# Patient Record
Sex: FEMALE | Race: ASIAN | NOT HISPANIC OR LATINO | Employment: UNEMPLOYED | URBAN - METROPOLITAN AREA
[De-identification: names, ages, dates, MRNs, and addresses within clinical notes are randomized per-mention and may not be internally consistent; named-entity substitution may affect disease eponyms.]

---

## 2017-01-10 ENCOUNTER — COMMUNICATION - HEALTHEAST (OUTPATIENT)
Dept: FAMILY MEDICINE | Facility: CLINIC | Age: 62
End: 2017-01-10

## 2017-01-10 DIAGNOSIS — E55.9 VITAMIN D DEFICIENCY: ICD-10-CM

## 2017-01-10 DIAGNOSIS — I10 ESSENTIAL HYPERTENSION: ICD-10-CM

## 2017-01-10 DIAGNOSIS — J30.2 SEASONAL ALLERGIES: ICD-10-CM

## 2017-01-11 ENCOUNTER — COMMUNICATION - HEALTHEAST (OUTPATIENT)
Dept: FAMILY MEDICINE | Facility: CLINIC | Age: 62
End: 2017-01-11

## 2017-01-11 DIAGNOSIS — F32.9 CHRONIC MAJOR DEPRESSIVE DISORDER: ICD-10-CM

## 2017-01-31 ENCOUNTER — AMBULATORY - HEALTHEAST (OUTPATIENT)
Dept: FAMILY MEDICINE | Facility: CLINIC | Age: 62
End: 2017-01-31

## 2017-01-31 ENCOUNTER — OFFICE VISIT - HEALTHEAST (OUTPATIENT)
Dept: FAMILY MEDICINE | Facility: CLINIC | Age: 62
End: 2017-01-31

## 2017-01-31 DIAGNOSIS — Z23 NEED FOR VACCINATION: ICD-10-CM

## 2017-01-31 DIAGNOSIS — Z00.00 ROUTINE GENERAL MEDICAL EXAMINATION AT A HEALTH CARE FACILITY: ICD-10-CM

## 2017-01-31 DIAGNOSIS — M25.532 PAIN IN BOTH WRISTS: ICD-10-CM

## 2017-01-31 DIAGNOSIS — M1A.0790 CHRONIC GOUT OF ANKLE, UNSPECIFIED CAUSE, UNSPECIFIED LATERALITY: ICD-10-CM

## 2017-01-31 DIAGNOSIS — G89.29 CHRONIC PAIN OF LEFT KNEE: ICD-10-CM

## 2017-01-31 DIAGNOSIS — E55.9 VITAMIN D DEFICIENCY: ICD-10-CM

## 2017-01-31 DIAGNOSIS — I10 ESSENTIAL HYPERTENSION WITH GOAL BLOOD PRESSURE LESS THAN 140/90: ICD-10-CM

## 2017-01-31 DIAGNOSIS — M25.562 CHRONIC PAIN OF LEFT KNEE: ICD-10-CM

## 2017-01-31 DIAGNOSIS — M25.531 PAIN IN BOTH WRISTS: ICD-10-CM

## 2017-01-31 LAB
CHOLEST SERPL-MCNC: 199 MG/DL
FASTING STATUS PATIENT QL REPORTED: NO
HDLC SERPL-MCNC: 42 MG/DL
LDLC SERPL CALC-MCNC: 128 MG/DL
TRIGL SERPL-MCNC: 143 MG/DL

## 2017-01-31 ASSESSMENT — MIFFLIN-ST. JEOR: SCORE: 1066.84

## 2017-02-09 ENCOUNTER — AMBULATORY - HEALTHEAST (OUTPATIENT)
Dept: LAB | Facility: CLINIC | Age: 62
End: 2017-02-09

## 2017-02-09 DIAGNOSIS — Z00.00 ROUTINE GENERAL MEDICAL EXAMINATION AT A HEALTH CARE FACILITY: ICD-10-CM

## 2017-12-26 ENCOUNTER — OFFICE VISIT - HEALTHEAST (OUTPATIENT)
Dept: FAMILY MEDICINE | Facility: CLINIC | Age: 62
End: 2017-12-26

## 2017-12-26 DIAGNOSIS — I10 ESSENTIAL HYPERTENSION: ICD-10-CM

## 2017-12-26 DIAGNOSIS — E55.9 VITAMIN D DEFICIENCY: ICD-10-CM

## 2017-12-26 DIAGNOSIS — I10 ESSENTIAL HYPERTENSION WITH GOAL BLOOD PRESSURE LESS THAN 140/90: ICD-10-CM

## 2017-12-26 DIAGNOSIS — R62.7 POOR WEIGHT GAIN IN ADULT: ICD-10-CM

## 2017-12-26 DIAGNOSIS — R63.0 ANOREXIA: ICD-10-CM

## 2017-12-26 DIAGNOSIS — H91.90 DEAF: ICD-10-CM

## 2017-12-26 DIAGNOSIS — F32.9 CHRONIC MAJOR DEPRESSIVE DISORDER: ICD-10-CM

## 2017-12-26 DIAGNOSIS — M1A.0790 CHRONIC GOUT OF ANKLE, UNSPECIFIED CAUSE, UNSPECIFIED LATERALITY: ICD-10-CM

## 2017-12-26 LAB
CHOLEST SERPL-MCNC: 178 MG/DL
FASTING STATUS PATIENT QL REPORTED: YES
HDLC SERPL-MCNC: 37 MG/DL
LDLC SERPL CALC-MCNC: 106 MG/DL
TRIGL SERPL-MCNC: 176 MG/DL

## 2017-12-26 ASSESSMENT — MIFFLIN-ST. JEOR: SCORE: 1046.08

## 2018-01-12 ENCOUNTER — COMMUNICATION - HEALTHEAST (OUTPATIENT)
Dept: FAMILY MEDICINE | Facility: CLINIC | Age: 63
End: 2018-01-12

## 2018-01-18 ENCOUNTER — OFFICE VISIT - HEALTHEAST (OUTPATIENT)
Dept: FAMILY MEDICINE | Facility: CLINIC | Age: 63
End: 2018-01-18

## 2018-01-18 DIAGNOSIS — E55.9 VITAMIN D DEFICIENCY: ICD-10-CM

## 2018-01-18 DIAGNOSIS — F32.9 CHRONIC MAJOR DEPRESSIVE DISORDER: ICD-10-CM

## 2018-01-18 DIAGNOSIS — I10 ESSENTIAL HYPERTENSION WITH GOAL BLOOD PRESSURE LESS THAN 140/90: ICD-10-CM

## 2018-01-18 ASSESSMENT — MIFFLIN-ST. JEOR: SCORE: 1063.8

## 2018-06-08 ENCOUNTER — COMMUNICATION - HEALTHEAST (OUTPATIENT)
Dept: FAMILY MEDICINE | Facility: CLINIC | Age: 63
End: 2018-06-08

## 2018-06-08 DIAGNOSIS — F32.9 CHRONIC MAJOR DEPRESSIVE DISORDER: ICD-10-CM

## 2018-06-08 DIAGNOSIS — I10 ESSENTIAL HYPERTENSION WITH GOAL BLOOD PRESSURE LESS THAN 140/90: ICD-10-CM

## 2018-06-08 DIAGNOSIS — R63.0 ANOREXIA: ICD-10-CM

## 2018-06-08 DIAGNOSIS — R62.7 POOR WEIGHT GAIN IN ADULT: ICD-10-CM

## 2018-06-08 DIAGNOSIS — I10 ESSENTIAL HYPERTENSION: ICD-10-CM

## 2018-06-08 DIAGNOSIS — E55.9 VITAMIN D DEFICIENCY: ICD-10-CM

## 2018-12-03 ENCOUNTER — OFFICE VISIT - HEALTHEAST (OUTPATIENT)
Dept: FAMILY MEDICINE | Facility: CLINIC | Age: 63
End: 2018-12-03

## 2018-12-03 DIAGNOSIS — I10 ESSENTIAL HYPERTENSION: ICD-10-CM

## 2018-12-03 DIAGNOSIS — M1A.0790 CHRONIC GOUT OF ANKLE, UNSPECIFIED CAUSE, UNSPECIFIED LATERALITY: ICD-10-CM

## 2018-12-03 DIAGNOSIS — H91.90 DEAFNESS, UNSPECIFIED LATERALITY: ICD-10-CM

## 2018-12-03 DIAGNOSIS — Z00.00 MEDICARE ANNUAL WELLNESS VISIT, SUBSEQUENT: ICD-10-CM

## 2018-12-03 DIAGNOSIS — J30.2 SEASONAL ALLERGIES: ICD-10-CM

## 2018-12-03 DIAGNOSIS — F32.9 CHRONIC MAJOR DEPRESSIVE DISORDER: ICD-10-CM

## 2018-12-03 DIAGNOSIS — I10 ESSENTIAL HYPERTENSION WITH GOAL BLOOD PRESSURE LESS THAN 140/90: ICD-10-CM

## 2018-12-03 DIAGNOSIS — E55.9 VITAMIN D DEFICIENCY: ICD-10-CM

## 2018-12-03 LAB
ALBUMIN SERPL-MCNC: 3.7 G/DL (ref 3.5–5)
ALP SERPL-CCNC: 107 U/L (ref 45–120)
ALT SERPL W P-5'-P-CCNC: 20 U/L (ref 0–45)
ANION GAP SERPL CALCULATED.3IONS-SCNC: 10 MMOL/L (ref 5–18)
AST SERPL W P-5'-P-CCNC: 18 U/L (ref 0–40)
BILIRUB SERPL-MCNC: 0.5 MG/DL (ref 0–1)
BUN SERPL-MCNC: 13 MG/DL (ref 8–22)
CALCIUM SERPL-MCNC: 9.5 MG/DL (ref 8.5–10.5)
CHLORIDE BLD-SCNC: 102 MMOL/L (ref 98–107)
CHOLEST SERPL-MCNC: 186 MG/DL
CO2 SERPL-SCNC: 29 MMOL/L (ref 22–31)
CREAT SERPL-MCNC: 0.78 MG/DL (ref 0.6–1.1)
ERYTHROCYTE [DISTWIDTH] IN BLOOD BY AUTOMATED COUNT: 12.2 % (ref 11–14.5)
FASTING STATUS PATIENT QL REPORTED: YES
GFR SERPL CREATININE-BSD FRML MDRD: >60 ML/MIN/1.73M2
GLUCOSE BLD-MCNC: 88 MG/DL (ref 70–125)
HCT VFR BLD AUTO: 42.8 % (ref 35–47)
HDLC SERPL-MCNC: 45 MG/DL
HGB BLD-MCNC: 14 G/DL (ref 12–16)
LDLC SERPL CALC-MCNC: 113 MG/DL
MCH RBC QN AUTO: 28.3 PG (ref 27–34)
MCHC RBC AUTO-ENTMCNC: 32.7 G/DL (ref 32–36)
MCV RBC AUTO: 86 FL (ref 80–100)
PLATELET # BLD AUTO: 214 THOU/UL (ref 140–440)
PMV BLD AUTO: 8.8 FL (ref 7–10)
POTASSIUM BLD-SCNC: 4 MMOL/L (ref 3.5–5)
PROT SERPL-MCNC: 7.7 G/DL (ref 6–8)
RBC # BLD AUTO: 4.95 MILL/UL (ref 3.8–5.4)
SODIUM SERPL-SCNC: 141 MMOL/L (ref 136–145)
TRIGL SERPL-MCNC: 142 MG/DL
TSH SERPL DL<=0.005 MIU/L-ACNC: 0.79 UIU/ML (ref 0.3–5)
URATE SERPL-MCNC: 6.4 MG/DL (ref 2–7.5)
WBC: 5.7 THOU/UL (ref 4–11)

## 2018-12-03 RX ORDER — IBUPROFEN 400 MG/1
TABLET, FILM COATED ORAL
Qty: 40 TABLET | Refills: 5 | Status: SHIPPED | OUTPATIENT
Start: 2018-12-03 | End: 2024-03-01

## 2018-12-03 RX ORDER — CETIRIZINE HYDROCHLORIDE 10 MG/1
10 TABLET ORAL DAILY
Qty: 30 TABLET | Refills: 5 | Status: SHIPPED | OUTPATIENT
Start: 2018-12-03 | End: 2021-11-11

## 2018-12-03 ASSESSMENT — MIFFLIN-ST. JEOR: SCORE: 1069.32

## 2018-12-04 ENCOUNTER — COMMUNICATION - HEALTHEAST (OUTPATIENT)
Dept: FAMILY MEDICINE | Facility: CLINIC | Age: 63
End: 2018-12-04

## 2018-12-04 LAB — 25(OH)D3 SERPL-MCNC: 12.1 NG/ML (ref 30–80)

## 2018-12-11 ENCOUNTER — RECORDS - HEALTHEAST (OUTPATIENT)
Dept: ADMINISTRATIVE | Facility: OTHER | Age: 63
End: 2018-12-11

## 2018-12-20 ENCOUNTER — COMMUNICATION - HEALTHEAST (OUTPATIENT)
Dept: FAMILY MEDICINE | Facility: CLINIC | Age: 63
End: 2018-12-20

## 2019-01-16 ENCOUNTER — COMMUNICATION - HEALTHEAST (OUTPATIENT)
Dept: FAMILY MEDICINE | Facility: CLINIC | Age: 64
End: 2019-01-16

## 2019-02-05 ENCOUNTER — COMMUNICATION - HEALTHEAST (OUTPATIENT)
Dept: FAMILY MEDICINE | Facility: CLINIC | Age: 64
End: 2019-02-05

## 2019-02-05 DIAGNOSIS — E55.9 VITAMIN D DEFICIENCY: ICD-10-CM

## 2019-02-05 DIAGNOSIS — I10 ESSENTIAL HYPERTENSION: ICD-10-CM

## 2019-02-05 DIAGNOSIS — I10 ESSENTIAL HYPERTENSION WITH GOAL BLOOD PRESSURE LESS THAN 140/90: ICD-10-CM

## 2019-02-05 RX ORDER — AMLODIPINE BESYLATE 10 MG/1
TABLET ORAL
Qty: 90 TABLET | Refills: 3 | Status: SHIPPED | OUTPATIENT
Start: 2019-02-05 | End: 2021-11-11

## 2019-05-10 ENCOUNTER — COMMUNICATION - HEALTHEAST (OUTPATIENT)
Dept: FAMILY MEDICINE | Facility: CLINIC | Age: 64
End: 2019-05-10

## 2019-05-10 DIAGNOSIS — R63.0 ANOREXIA: ICD-10-CM

## 2019-05-10 DIAGNOSIS — R62.7 POOR WEIGHT GAIN IN ADULT: ICD-10-CM

## 2019-05-10 RX ORDER — NUTRIT SUPP/INULIN/FOS/FIBER 0.05G-1.06
LIQUID (ML) ORAL
Qty: 5,688 ML | Refills: 5 | Status: SHIPPED | OUTPATIENT
Start: 2019-05-10 | End: 2024-03-01

## 2019-08-02 ENCOUNTER — COMMUNICATION - HEALTHEAST (OUTPATIENT)
Dept: FAMILY MEDICINE | Facility: CLINIC | Age: 64
End: 2019-08-02

## 2019-08-21 ENCOUNTER — COMMUNICATION - HEALTHEAST (OUTPATIENT)
Dept: FAMILY MEDICINE | Facility: CLINIC | Age: 64
End: 2019-08-21

## 2019-10-31 ENCOUNTER — OFFICE VISIT - HEALTHEAST (OUTPATIENT)
Dept: FAMILY MEDICINE | Facility: CLINIC | Age: 64
End: 2019-10-31

## 2019-10-31 DIAGNOSIS — M1A.0790 CHRONIC GOUT OF ANKLE, UNSPECIFIED CAUSE, UNSPECIFIED LATERALITY: ICD-10-CM

## 2019-10-31 DIAGNOSIS — I10 ESSENTIAL HYPERTENSION WITH GOAL BLOOD PRESSURE LESS THAN 140/90: ICD-10-CM

## 2019-10-31 DIAGNOSIS — H91.90 DEAFNESS, UNSPECIFIED LATERALITY: ICD-10-CM

## 2019-10-31 DIAGNOSIS — Z71.85 IMMUNIZATION COUNSELING: ICD-10-CM

## 2019-10-31 DIAGNOSIS — F32.9 CHRONIC MAJOR DEPRESSIVE DISORDER: ICD-10-CM

## 2019-10-31 RX ORDER — DULOXETIN HYDROCHLORIDE 30 MG/1
30 CAPSULE, DELAYED RELEASE ORAL DAILY
Qty: 30 CAPSULE | Refills: 3 | Status: SHIPPED | OUTPATIENT
Start: 2019-10-31 | End: 2021-11-11

## 2019-10-31 RX ORDER — LOSARTAN POTASSIUM 50 MG/1
50 TABLET ORAL DAILY
Qty: 30 TABLET | Refills: 3 | Status: SHIPPED | OUTPATIENT
Start: 2019-10-31 | End: 2021-11-11

## 2019-10-31 ASSESSMENT — MIFFLIN-ST. JEOR: SCORE: 1037.57

## 2019-11-15 ENCOUNTER — COMMUNICATION - HEALTHEAST (OUTPATIENT)
Dept: GERIATRIC MEDICINE | Facility: CLINIC | Age: 64
End: 2019-11-15

## 2019-11-21 ENCOUNTER — COMMUNICATION - HEALTHEAST (OUTPATIENT)
Dept: GERIATRIC MEDICINE | Facility: CLINIC | Age: 64
End: 2019-11-21

## 2020-01-23 ENCOUNTER — COMMUNICATION - HEALTHEAST (OUTPATIENT)
Dept: GERIATRIC MEDICINE | Facility: CLINIC | Age: 65
End: 2020-01-23

## 2020-02-05 ENCOUNTER — OFFICE VISIT - HEALTHEAST (OUTPATIENT)
Dept: FAMILY MEDICINE | Facility: CLINIC | Age: 65
End: 2020-02-05

## 2020-02-05 DIAGNOSIS — M25.562 CHRONIC PAIN OF LEFT KNEE: ICD-10-CM

## 2020-02-05 DIAGNOSIS — F32.1 MODERATE MAJOR DEPRESSION (H): ICD-10-CM

## 2020-02-05 DIAGNOSIS — E55.9 VITAMIN D DEFICIENCY: ICD-10-CM

## 2020-02-05 DIAGNOSIS — G89.29 CHRONIC PAIN OF LEFT KNEE: ICD-10-CM

## 2020-02-05 RX ORDER — ACETAMINOPHEN 500 MG
1000 TABLET ORAL EVERY 6 HOURS PRN
Qty: 100 TABLET | Refills: 2 | Status: SHIPPED | OUTPATIENT
Start: 2020-02-05 | End: 2021-11-11

## 2020-02-05 ASSESSMENT — MIFFLIN-ST. JEOR: SCORE: 1042.11

## 2020-02-05 ASSESSMENT — PATIENT HEALTH QUESTIONNAIRE - PHQ9: SUM OF ALL RESPONSES TO PHQ QUESTIONS 1-9: 17

## 2020-03-05 ENCOUNTER — COMMUNICATION - HEALTHEAST (OUTPATIENT)
Dept: FAMILY MEDICINE | Facility: CLINIC | Age: 65
End: 2020-03-05

## 2020-03-30 ENCOUNTER — RECORDS - HEALTHEAST (OUTPATIENT)
Dept: ADMINISTRATIVE | Facility: OTHER | Age: 65
End: 2020-03-30

## 2020-05-11 ENCOUNTER — COMMUNICATION - HEALTHEAST (OUTPATIENT)
Dept: GERIATRIC MEDICINE | Facility: CLINIC | Age: 65
End: 2020-05-11

## 2020-10-15 ENCOUNTER — COMMUNICATION - HEALTHEAST (OUTPATIENT)
Dept: GERIATRIC MEDICINE | Facility: CLINIC | Age: 65
End: 2020-10-15

## 2020-10-26 ENCOUNTER — COMMUNICATION - HEALTHEAST (OUTPATIENT)
Dept: GERIATRIC MEDICINE | Facility: CLINIC | Age: 65
End: 2020-10-26

## 2020-10-28 ENCOUNTER — COMMUNICATION - HEALTHEAST (OUTPATIENT)
Dept: GERIATRIC MEDICINE | Facility: CLINIC | Age: 65
End: 2020-10-28

## 2021-01-15 ENCOUNTER — AMBULATORY - HEALTHEAST (OUTPATIENT)
Dept: FAMILY MEDICINE | Facility: CLINIC | Age: 66
End: 2021-01-15

## 2021-03-27 ENCOUNTER — AMBULATORY - HEALTHEAST (OUTPATIENT)
Dept: NURSING | Facility: CLINIC | Age: 66
End: 2021-03-27

## 2021-05-27 ASSESSMENT — PATIENT HEALTH QUESTIONNAIRE - PHQ9: SUM OF ALL RESPONSES TO PHQ QUESTIONS 1-9: 17

## 2021-05-28 NOTE — TELEPHONE ENCOUNTER
RN cannot approve Refill Request    RN can NOT refill this medication med is not covered by policy/route to provider     . Last office visit: 1/18/2018 Hector Banda MD Last Physical: 12/3/2018 Last MTM visit: Visit date not found Last visit same specialty: 1/18/2018 Hector Banda MD.  Next visit within 3 mo: Visit date not found  Next physical within 3 mo: Visit date not found      Violet Flores, [facility] Triage/Med Refill 5/10/2019    Requested Prescriptions   Pending Prescriptions Disp Refills     food supplemt, lactose-reduced (ENSURE) Liqd 5688 mL 5     Sig: DRINK 1 BOTTLE BY MOUTH DAILY // IB HNUB HAUS 1 POOM       There is no refill protocol information for this order

## 2021-05-30 VITALS — BODY MASS INDEX: 27.82 KG/M2 | HEIGHT: 59 IN | WEIGHT: 138 LBS

## 2021-05-31 VITALS — BODY MASS INDEX: 27.83 KG/M2 | WEIGHT: 138.03 LBS | HEIGHT: 59 IN

## 2021-05-31 VITALS — BODY MASS INDEX: 28.34 KG/M2 | WEIGHT: 135 LBS | HEIGHT: 58 IN

## 2021-05-31 NOTE — TELEPHONE ENCOUNTER
No CTC on file and phone number listed is for patient's daughter and can not talk to her.  See previous encounter. Sending out letter and postponing out 2 weeks and will try again if appt has not been made.

## 2021-05-31 NOTE — TELEPHONE ENCOUNTER
Upon calling patient to schedule appt, it looks like this patient had a physical on 12/03/2018 with Dr. Banda, does this patient need to be seen again for a Physical? And the contact number on file is for patient's daughter. She stated that patient is hard of hearing and she is the  for patient in regards to all her healthcare needs, and that the  patient does not have a phone, but there is no consent to communicate with patient's daughter on file, so no information can be disclosed.  Advised her that we can not talk to her, and advised her to come in to sign the CTC so we can talk to her. Please advise if patient needs to be seen for another physical?

## 2021-05-31 NOTE — TELEPHONE ENCOUNTER
Called and left message for pt to call clinic back#1 used  line: [facility] ID#[id number]    Quality Goal: Patient is due for a mammogram Screening, Annual Wellness Visit and Blood pressure check. Please call the patient for an appointment with . Thanks!

## 2021-05-31 NOTE — TELEPHONE ENCOUNTER
Pt does not need to be seen for another physical but is due for a blood pressure check and mammogram

## 2021-06-02 VITALS — WEIGHT: 141 LBS | BODY MASS INDEX: 29.6 KG/M2 | HEIGHT: 58 IN

## 2021-06-02 NOTE — PROGRESS NOTES
Subjective: Patient comes in for evaluation.  She has been on amlodipine 10 mg a day although did not take it today.  Pressure was 160/72 last time when she was on it was also elevated.  Want her to continue on the amlodipine 10 mg a day, she has no edema, but will add losartan 50 mg a day I will see her back for recheck in 6 weeks    Regarding her depression she is on sertraline it does not seem like it is really help that much.  She has not been in since last December but has been taking it regularly.    She does have some chronic pain issues in through her neck more on the left than the right goes into her shoulder but no further radicular component.  She does use Tylenol it helps a while but only short-term.    I think there is some somatic component I think changing from sertraline over to duloxetine would be helpful for her.  I placed her on 30 mg duloxetine she will stop the sertraline.    She is due for PCV 13 shot now that she is 64.    Labs from her annual wellness visit in December of last year showed CMP normal  normal TSH normal CBC uric acid normal at 6.4.  She has had some gout in her ankle in the past.  Does not seem to have much symptoms now.    Tobacco status: She  reports that she has never smoked. She has never used smokeless tobacco.    Patient Active Problem List    Diagnosis Date Noted     Chronic pain of left knee 02/04/2017     Essential hypertension with goal blood pressure less than 140/90 01/31/2017     Chronic gout of ankle, unspecified cause, unspecified laterality 01/31/2017     Anorexia 02/19/2016     Deaf 01/30/2016     Chronic major depressive disorder 01/29/2016     Vitamin D deficiency 01/29/2016     Seasonal allergies 01/29/2016       Current Outpatient Medications   Medication Sig Dispense Refill     amLODIPine (NORVASC) 10 MG tablet TAKE 1 TABLET DAILY FOR HIGH BLOOD PRESSURE // IB HNUB NOJ IB LUB PAB KAY NTSHAV SIAB 90 tablet 3     blood pressure test kit-medium Kit     "    cetirizine (ZYRTEC) 10 MG tablet Take 1 tablet (10 mg total) by mouth daily. 30 tablet 5     cholecalciferol, vitamin D3, (VITAMIN D3) 2,000 unit capsule TAKE 1 CAPSULE BY MOUTH DAILY // IB HNUB NOJ IB LUB 90 capsule 3     food supplemt, lactose-reduced (ENSURE) Liqd DRINK 1 BOTTLE BY MOUTH DAILY // IB HNUB HAUS 1 POOM 5688 mL 5     ibuprofen (ADVIL,MOTRIN) 400 MG tablet 1 po tid as needed for gout pain. 40 tablet 5     DULoxetine (CYMBALTA) 30 MG capsule Take 1 capsule (30 mg total) by mouth daily. 30 capsule 3     losartan (COZAAR) 50 MG tablet Take 1 tablet (50 mg total) by mouth daily. 30 tablet 3     No current facility-administered medications for this visit.        ROS:   10 point review of systems positive as outlined above otherwise negative    Objective:    /72 (Patient Site: Left Arm, Patient Position: Sitting, Cuff Size: Adult Regular)   Pulse 60   Temp 97.2  F (36.2  C) (Oral)   Resp 20   Ht 4' 10\" (1.473 m)   Wt 134 lb (60.8 kg)   LMP 01/29/2005   SpO2 100%   BMI 28.01 kg/m    Body mass index is 28.01 kg/m .      General appearance no acute distress    HEENT patient is deaf but is able to talk sounding she communicates with her daughter who is here and there is also an .    Neck with some mild tenderness in the paraspinal muscles.  No radicular component more on the left than the right.    No rashes in through the skin    Heart was regular S1-S2 rate at 60    Lungs are clear throughout no rales or rhonchi, O2 sat was 100%.    Abdomen nontender no guarding or rebound    Lower extremities without edema    No active gouty changes.    Results for orders placed or performed in visit on 12/03/18   Comprehensive Metabolic Panel   Result Value Ref Range    Sodium 141 136 - 145 mmol/L    Potassium 4.0 3.5 - 5.0 mmol/L    Chloride 102 98 - 107 mmol/L    CO2 29 22 - 31 mmol/L    Anion Gap, Calculation 10 5 - 18 mmol/L    Glucose 88 70 - 125 mg/dL    BUN 13 8 - 22 mg/dL    Creatinine " 0.78 0.60 - 1.10 mg/dL    GFR MDRD Af Amer >60 >60 mL/min/1.73m2    GFR MDRD Non Af Amer >60 >60 mL/min/1.73m2    Bilirubin, Total 0.5 0.0 - 1.0 mg/dL    Calcium 9.5 8.5 - 10.5 mg/dL    Protein, Total 7.7 6.0 - 8.0 g/dL    Albumin 3.7 3.5 - 5.0 g/dL    Alkaline Phosphatase 107 45 - 120 U/L    AST 18 0 - 40 U/L    ALT 20 0 - 45 U/L   Lipid Cascade FASTING   Result Value Ref Range    Cholesterol 186 <=199 mg/dL    Triglycerides 142 <=149 mg/dL    HDL Cholesterol 45 (L) >=50 mg/dL    LDL Calculated 113 <=129 mg/dL    Patient Fasting > 8hrs? Yes    Thyroid Stimulating Hormone (TSH)   Result Value Ref Range    TSH 0.79 0.30 - 5.00 uIU/mL   Vitamin D, Total (25-Hydroxy)   Result Value Ref Range    Vitamin D, Total (25-Hydroxy) 12.1 (L) 30.0 - 80.0 ng/mL   HM2(CBC w/o Differential)   Result Value Ref Range    WBC 5.7 4.0 - 11.0 thou/uL    RBC 4.95 3.80 - 5.40 mill/uL    Hemoglobin 14.0 12.0 - 16.0 g/dL    Hematocrit 42.8 35.0 - 47.0 %    MCV 86 80 - 100 fL    MCH 28.3 27.0 - 34.0 pg    MCHC 32.7 32.0 - 36.0 g/dL    RDW 12.2 11.0 - 14.5 %    Platelets 214 140 - 440 thou/uL    MPV 8.8 7.0 - 10.0 fL   Uric Acid   Result Value Ref Range    Uric Acid 6.4 2.0 - 7.5 mg/dL       Assessment:  1. Essential hypertension with goal blood pressure less than 140/90  losartan (COZAAR) 50 MG tablet   2. Chronic major depressive disorder  DULoxetine (CYMBALTA) 30 MG capsule   3. Immunization counseling  Pneumococcal conjugate vaccine 13-valent 6wks-17yrs; >50yrs   4. Deafness, unspecified laterality     5. Chronic gout of ankle, unspecified cause, unspecified laterality       Hypertension suboptimal control, make sure to take amlodipine every day and add losartan 50 mg a day    Regarding major depression and some chronic pain issues.  The sertraline and tried duloxetine 30 mg 1 a day    Immunization counseling PCV 13 given today.    History of gout no active symptoms.    Left cervical pain, can use Tylenol as well.  Consider physical  therapy    We will see her back for recheck in 6 weeks    Plan: As outlined above    This transcription uses voice recognition software, which may contain typographical errors.

## 2021-06-03 VITALS
HEART RATE: 60 BPM | HEIGHT: 58 IN | BODY MASS INDEX: 28.13 KG/M2 | OXYGEN SATURATION: 100 % | RESPIRATION RATE: 20 BRPM | TEMPERATURE: 97.2 F | WEIGHT: 134 LBS | SYSTOLIC BLOOD PRESSURE: 160 MMHG | DIASTOLIC BLOOD PRESSURE: 72 MMHG

## 2021-06-03 NOTE — PROGRESS NOTES
[facility] Care Coordination Contact    Member became effective with  [facility] on 11/1/2019 with [facility]+.  Previous Health Plan: [facility]  Previous Care System: [facility]  Previous care coordinators name and number: John Pérez, [phone]  Waiver Type: CADI  Last MMIS Entry: Date 11/16/18 and Type Doc Change  MMIS visit date if different from above: 11/9/18, Assmt  Services Listed in MMIS:   A3 F MNCHSE-CSP 20 F DAY SVC 35 F CASE MGMT  46 F EXT PCA 06 F HOMEMAKER 22 F INDEPEND  07 F MONEY MGT 98 F OTHER 66 F PCA SUPER  18 F PERSONAL 25 F SUPPLIES 46 F WVRAC RYDER  Member currently receiving the following home care services:     Member currently receiving the following community resources:    [facility] received: No: Requested on 11/15/2019     Edith Hankins  Care Management Specialist  [facility]  [phone]

## 2021-06-03 NOTE — PROGRESS NOTES
[facility] Care Coordination Contact      [facility] Refusal Telephone Assessment    Member refused home visit HRA on 11/19/2019 (reason: does not wish to participate in a face to face visit at this time).   CC spoke to Ma s daughter, Amber Ernandez ([phone]) because Ma is deaf.  Amber reports family communicates with Ma by their own made-up sign language.  Amber states Ma and family does not wish to have a face to face visit with this CC because she gets anxious with new people and is already being followed by CADI . Amber holbrook Ma lives with two teenage sons in an apartment building but spends time at daughter s home with her family. Amber holbrook Ma is receiving PCA, ILS, homemaking, and ADC services through CADI waiver program which are meeting her ongoing needs in the community. Family is also very supportive and assists Ma with cares as needed according to Amber.   CC also reached out to Ma's CADI , Mattie Gallegos ([phone]) to introduced self as Ma's new [facility] CC and requests for [facility] document.  Clayton informed CC that Ma is due for her annual [employer] assessment by [facility] in December and then Mattie will schedule a home visit to review needed services and authorize them at that time.   ER visits: No  Hospitalizations: No  Health concerns: None reported.  Falls/Injuries: No  ADL/IADL Dependencies:   N/A. This CC did not assess.      Member currently receiving the following home care services:   N/A  Member currently receiving the following community resources:  PCA, ADC, Homemaking, ILS, CADI .   Informal support(s):  Family is supportive.    Advanced Care Planning discussion, complete code section.    [facility] Health Plan sponsored benefits: Shared information re: Silver Sneakers/gym memberships, ASA, Calcium +D.    Follow-Up Plan: Member informed of future contact, plan to f/u with member with a 6 month telephone assessment and offer a home visit.  Contact  information shared with member and family, encouraged member to call with any questions or concerns at any time.    Jaswant Cook RN, PHN  [facility] Care Coordinator  Phone: [phone]  Fax [phone]   [email or web address]

## 2021-06-04 VITALS
BODY MASS INDEX: 28.34 KG/M2 | WEIGHT: 135 LBS | SYSTOLIC BLOOD PRESSURE: 137 MMHG | DIASTOLIC BLOOD PRESSURE: 83 MMHG | HEART RATE: 63 BPM | HEIGHT: 58 IN

## 2021-06-05 NOTE — PROGRESS NOTES
Subjective:  64 y.o. female with concerns of needing a wheel chair.  New pt to me, sees partner.  She is deaf.  Daughter interprets her spoken spoken Hmong and communicates to her with signs.  Says form was sent to clinic for wheel chair, bu is not available now.  Sent from .  I cannot find notes to confirm.    Hx of knee pain.  Can walk 10-15 min before cannot walk any more.  Uses can around home.  Want wheel chair for shopping, etc.    Htn.  Reports taking meds.  Denies HA or dizziness.  Energy is low.  Declines blood tests to evaluate.  Requests vitamins.    Depression with change to SNRI Cymbalta in October.  Doesn't want to change anything with that now.  PHQ with slight improvement from 19 to 17    PHQ-9:  Little interest or pleasure in doing things: Several days  Feeling down, depressed, or hopeless: More than half the days  Trouble falling or staying asleep, or sleeping too much: More than half the days  Feeling tired or having little energy: Nearly every day  Poor appetite or overeating: Several days  Feeling bad about yourself - or that you are a failure or have let yourself or your family down: More than half the days  Trouble concentrating on things, such as reading the newspaper or watching television: Nearly every day  Moving or speaking so slowly that other people could have noticed. Or the opposite - being so fidgety or restless that you have been moving around a lot more than usual: Nearly every day  Thoughts that you would be better off dead, or of hurting yourself in some way: Not at all  PHQ-9 Total Score: 17     Outpatient Medications Prior to Visit   Medication Sig Dispense Refill     amLODIPine (NORVASC) 10 MG tablet TAKE 1 TABLET DAILY FOR HIGH BLOOD PRESSURE // IB HNUB NOJ IB LUB PAB KAY NTSHAV SIAB 90 tablet 3     blood pressure test kit-medium Kit        cetirizine (ZYRTEC) 10 MG tablet Take 1 tablet (10 mg total) by mouth daily. 30 tablet 5     cholecalciferol, vitamin D3,  "(VITAMIN D3) 2,000 unit capsule TAKE 1 CAPSULE BY MOUTH DAILY // IB HNUB NOJ IB LUB 90 capsule 3     DULoxetine (CYMBALTA) 30 MG capsule Take 1 capsule (30 mg total) by mouth daily. 30 capsule 3     food supplemt, lactose-reduced (ENSURE) Liqd DRINK 1 BOTTLE BY MOUTH DAILY // IB HNUB HAUS 1 POOM 5688 mL 5     ibuprofen (ADVIL,MOTRIN) 400 MG tablet 1 po tid as needed for gout pain. 40 tablet 5     losartan (COZAAR) 50 MG tablet Take 1 tablet (50 mg total) by mouth daily. 30 tablet 3     No facility-administered medications prior to visit.       Social History     Tobacco Use   Smoking Status Never Smoker   Smokeless Tobacco Never Used      Objective:  /83 (Patient Site: Right Arm, Patient Position: Sitting)   Pulse 63   Ht 4' 10\" (1.473 m)   Wt 135 lb (61.2 kg)   LMP 01/29/2005   BMI 28.22 kg/m    GENERAL: alert, not distressed  CHEST: clear, no rales, rhonchi, or wheezes  CARDIAC: regular without murmur, gallop, or rub  ABDOMEN: soft, non tender, non distended, normal bowel sounds    Knee Left     No effusion  No erythema  No warmth    Tenderness to palpation at inferior to joing line    Lachmans: negative   Anterior drawer: negative     Varus stress: non tender  Valgus stress: nontender    Modified Appleys:  negative     Patellar grind: negative      Assessment and Plan:   1. Chronic pain of left knee  Suspect DJD.  We don't have imaging.  They declined today.  I can try to fill out the forms if I see them for wheel chair, but discussed that it might be difficult to get coverage.  Daughter will try to have them re-sent.  - acetaminophen (TYLENOL EXTRA STRENGTH) 500 MG tablet; Take 2 tablets (1,000 mg total) by mouth every 6 (six) hours as needed for pain.  Dispense: 100 tablet; Refill: 2    2. Vitamin D deficiency  They declined labs to evaluate for causes of low energy.    - multivitamin (ONE A DAY) per tablet; Take 1 tablet by mouth daily.  Dispense: 120 tablet; Refill: 2    3. Moderate major " depression (H)  They declined changes to meds.    Cologard was done 8/16/19  She declined mammogram.

## 2021-06-05 NOTE — PROGRESS NOTES
[facility] Care Coordination Contact      1/23/2020  T/c to Amberortiz Ernandez to f/u on AVS form.  She states Ma has not received the form and request for CC to email her a copy.  CC agreed and emailed Amber a copy per her request.      1/22/2020   CC t/c to Ma, spoke to son, Konstantin Pérez, inquired if Ma has received the AVS form and [facility].  Konstantin is not sure and will check w/ spouse, Amber Eranndez, and get back to CC.  Jaswant Cook RN, PHN  [facility] Care Coordinator  Phone: [phone]  Fax [phone]   [email or web address]

## 2021-06-06 NOTE — TELEPHONE ENCOUNTER
Forms Request  Name of form/paperwork: Other:  Wheel chair request  Have you been seen for this request: Yes:  The patient had anppointment on 2/5/20 requesting the wheelchair  Do we have the form: Caller states she faxed form on 2/5/20 and 2/24/20. Do you have the form? Caller states she will refax the form today.  When is form needed by: As soon as possible  How would you like the form returned: Fax:  [phone]  Patient Notified form requests are processed in 3-5 business days: No. Caller has been waiting for the form since 2/5/20.    Okay to leave a detailed message? Yes

## 2021-06-07 NOTE — TELEPHONE ENCOUNTER
Who is calling:  Amber  Reason for Call:  Checking the status of form for wheel chair  Date of last appointment with primary care: 02/05/20  Okay to leave a detailed message: Yes

## 2021-06-07 NOTE — TELEPHONE ENCOUNTER
Abdiel and Pt's daughter Amber informed that wheel chair order request has been faxed to [phone].

## 2021-06-07 NOTE — PROGRESS NOTES
Visit addendum:      Patient does have ability restriction that impairs activities of daily living.  The mobility limitation is walking reduction, needed due to degenerative arthritis of the knees.  A cane or walker will not be sufficient.  The home has adequate space for a wheelchair.  The wheelchair will improve her ability to participate in activities of daily living.  Patient is willing to use wheelchair in the home.  She will be able to self propel as needed and to have family help to move the wheelchair.  I am prescribing a standard wheelchair with standard seat cushion and general adjustable back cushion.  There should also be elevating leg rests.

## 2021-06-08 NOTE — PROGRESS NOTES
Assessment:      Healthy female exam. Physical  Exam     1. Routine general medical examination at a health care facility  Lipid Emery FASTING    Occult Blood, Fecal    Occult Blood, Fecal    Occult Blood, Fecal   2. Essential hypertension with goal blood pressure less than 140/90  Basic Metabolic Panel   3. Vitamin D deficiency  Vitamin D, Total (25-Hydroxy)   4. Chronic gout of ankle, unspecified cause, unspecified laterality  Uric Acid   5. Pain in both wrists  Wrist Brace w/o Thumb, [place]   6. Chronic pain of left knee  Knee Sleeve, L'Timate   7. Need for vaccination  Tdap vaccine,  8yo or older,  IM    Influenza, Seasonal,Quad Inj, 36+ MOS          Plan:       Blood tests: Basic metabolic panel and Lipoproteins.      Pt declines colonoscopy.  E/M for gout, wrist pain, knee pain.  Pt will use tylenol for pain.  Seen with [facility] , Ophelia Samson.    Subjective:      Sivakumar Ernandez is a 61 y.o. female who presents for an annual exam. The patient is not sexually active. The patient participates in regular exercise: yes. The patient reports that there is not domestic violence in her life.     Pt arrived 20 minutes late.  Dtr does sign language interpretation for her.    Pt attends an adult Day Program every Monday.    She c/o bilateral wrist pain, and left knee pain.  Uses a cane.  Hx gout.    Healthy Habits:   Regular Exercise: Yes  Sunscreen Use: Yes  Healthy Diet: Yes  Dental Visits Regularly: Yes  Seat Belt: Yes  Sexually active: No  Self Breast Exam Monthly:N/A  Hemoccults: No  Flex Sig: No  Colonoscopy: No  Lipid Profile: No  Glucose Screen: No  Prevention of Osteoporosis: No  Last Dexa: No  Guns at Home:  No      Immunization History   Administered Date(s) Administered     Influenza, seasonal,quad inj 36+ mos 01/31/2017     Tdap 01/31/2017     Immunization status: stated as current, but no records available.    No exam data present    Gynecologic History  Patient's last menstrual period was  2005.  Contraception: post menopausal status  Last Pap: . Results were:  Last mammogram: 16. Results were: normal      OB History    Para Term  AB SAB TAB Ectopic Multiple Living   3 3 3             # Outcome Date GA Lbr Irving/2nd Weight Sex Delivery Anes PTL Lv   3 Term            2 Term            1 Term                   Current Outpatient Prescriptions   Medication Sig Dispense Refill     amLODIPine (NORVASC) 10 MG tablet TAKE 1 TABLET DAILY FOR HIGH BLOOD PRESSURE // IB HNUB NOJ IB LUB PAB KAY NTSHAV SIAB 30 tablet 5     blood pressure test kit-medium Kit        cetirizine (ZYRTEC) 10 MG tablet TAKE 1 TABLET BY MOUTH ONCE DAILY FOR ALLERGIES // IB HNUB NOJ 1 LUB PAB KAY KHAUS THUAS 30 tablet 5     FLUoxetine (PROZAC) 10 MG capsule TAKE 1 CAPSULE BY MOUTH ONCE DAILY FOR DEPRESSION // IB HNUB NOJ 1 LUB PAB KAY NYUAB SIAB 30 capsule 0     food supplemt, lactose-reduced (ENSURE ORIGINAL) Liqd Drink 1 can daily 30 Can 11     VITAMIN D3 2,000 unit cap TAKE 1 CAPSULE BY MOUTH DAILY // IB HNUB NOJ IB LUB 30 each 5     No current facility-administered medications for this visit.      Past Medical History:   Diagnosis Date     Fibrocystic breast      No past surgical history on file.  Review of patient's allergies indicates no known allergies.  Family History   Problem Relation Age of Onset     Breast cancer Neg Hx      Social History     Social History     Marital status:      Spouse name: N/A     Number of children: N/A     Years of education: N/A     Occupational History     Not on file.     Social History Main Topics     Smoking status: Never Smoker     Smokeless tobacco: Not on file     Alcohol use No     Drug use: No     Sexual activity: Not on file     Other Topics Concern     Not on file     Social History Narrative       Review of Systems  General:  Denies problem  Eyes: Denies problem  Ears/Nose/Throat: Denies problem  Cardiovascular: Denies problem  Respiratory:  Denies  "problem  Gastrointestinal:  Denies problem, Genitourinary: Denies problem  Musculoskeletal:  Left knee, wrists.  Skin: Denies problem  Neurologic: Denies problem  Psychiatric: Denies problem  Endocrine: Denies problem  Heme/Lymphatic: Denies problem   Allergic/Immunologic: Denies problem        Objective:         Vitals:    01/31/17 0734 01/31/17 0738 01/31/17 0757   BP: (!) 150/92 146/90 136/86   Pulse: 61     Resp: 17     Temp: 98.3  F (36.8  C)     TempSrc: Oral     SpO2: 97%     Weight: 138 lb (62.6 kg)     Height: 4' 10.7\" (1.491 m)       Body mass index is 28.16 kg/(m^2).    Physical Exam:  General Appearance: Alert, cooperative, no distress, appears stated age  Head: Normocephalic, without obvious abnormality, atraumatic  Eyes: PERRL, conjunctiva/corneas clear, EOM's intact  Ears: Normal TM's and external ear canals, both ears  Nose: Nares normal, septum midline,mucosa normal, no drainage  Throat: Lips, mucosa, and tongue normal; teeth and gums normal  Neck: Supple, symmetrical, trachea midline, no adenopathy;  thyroid: not enlarged, symmetric, no tenderness/mass/nodules; no carotid bruit or JVD  Back: Symmetric, no curvature, ROM normal, no CVA tenderness  Lungs: Clear to auscultation bilaterally, respirations unlabored  Breasts: Pt declined  Heart: Regular rate and rhythm, S1 and S2 normal, no murmur, rub, or gallop,   Abdomen: Soft, non-tender, bowel sounds active all four quadrants,  no masses, no organomegaly  Pelvic:Not examined - pt declined  Extremities: Extremities normal, atraumatic, no cyanosis or edema.  Left knee slightly tender near infrapatellar tendon.  Skin: Skin color, texture, turgor normal, no rashes or lesions  Lymph nodes: Cervical, supraclavicular, and axillary nodes normal  Neurologic: Normal        "

## 2021-06-08 NOTE — PROGRESS NOTES
[facility] Care Coordination Contact      6 month telephone assessment completed on 5/11/2020.  CC spoke to Ma s daughter, Amber Ernandez ([phone]) because Ma is deaf.  Amber reports family communicates with Ma by their own made-up sign language.      ER visits: No  Hospitalizations: No.   TCU stays: No  Significant health status changes: None reported.  Falls/Injuries: No  ADL/IADL changes: No  Changes in services: No    Caregiver Assessment follow up:  N/A    Goals: See POC in chart for goal progress documentation.      Will see member in 6 months for an annual health risk assessment.   Encouraged member to call CC with any questions or concerns in the meantime.       Jaswant Cook RN, PHN  [facility] Care Coordinator  Phone: [phone]  Fax [phone]   [email or web address]

## 2021-06-12 NOTE — PROGRESS NOTES
[facility] Care Coordination Contact      [facility] Refusal Telephone Assessment    Member refused home visit HRA on 10/15/2020 (reason:   CC spoke to Ma s daughter, Amber Ernandez ([phone]) because Ma is deaf.  Amber reports family communicates with Ma by their own made-up sign language.  Amber states Ma and family does not wish to have a face to face visit.       ER visits: No  Hospitalizations: No  Health concerns: None reported.  Falls/Injuries: No  ADL/IADL Dependencies:      N/A. This CC did not assess.      Member currently receiving the following home care services:  N/A   Member currently receiving the following community resources:   PCA, ADC, Homemaking, ILS, CADI .  Informal support(s):  Family is supportive.    Advanced Care Planning discussion, complete code section.    [facility] Health Plan sponsored benefits: Shared information re: Silver Sneakers/gym memberships, ASA, Calcium +D.    Follow-Up Plan: Member informed of future contact, plan to f/u with member with a 6 month telephone assessment and offer a home visit.  Contact information shared with member and family, encouraged member to call with any questions or concerns at any time.    Jaswant Cook RN, PHN  [facility] Care Coordinator  Phone: [phone]  Fax [phone]   [email or web address]

## 2021-06-12 NOTE — PROGRESS NOTES
[facility] Care Coordination Contact    Internal CC change effective 11/1/2020.  Mailed member CC Change letter.  Additional tasks to be completed by CMS include: update database & EPIC, enter CC Change in MMIS, and move member files on Q drive.  Shona Amaro  Case Management Specialist  [facility]  [phone]

## 2021-06-12 NOTE — PROGRESS NOTES
[facility] Care Coordination Contact        Client is transferred to [facility]  Alexandra richards 11/01/2020.  A warm hand-off verbal report to new CC was given.      Jaswant Cook RN, PHN  [facility] Care Coordinator  Phone: [phone]  Fax [phone]   [email or web address]

## 2021-06-15 PROBLEM — M25.562 CHRONIC PAIN OF LEFT KNEE: Status: ACTIVE | Noted: 2017-02-04

## 2021-06-15 PROBLEM — M1A.0790 CHRONIC GOUT OF ANKLE, UNSPECIFIED CAUSE, UNSPECIFIED LATERALITY: Status: ACTIVE | Noted: 2017-01-31

## 2021-06-15 PROBLEM — G89.29 CHRONIC PAIN OF LEFT KNEE: Status: ACTIVE | Noted: 2017-02-04

## 2021-06-15 PROBLEM — I10 ESSENTIAL HYPERTENSION: Status: ACTIVE | Noted: 2017-01-31

## 2021-06-15 NOTE — PROGRESS NOTES
Subjective: This patient comes in for evaluation she is a 62-year-old female.  She was last seen over at [place] she had a physical in 2017 with Dr. Ramos.    Patient will be coming to this clinic now.  She states it is more convenient to come here.    She is here with her daughter and granddaughter.  Daughter's name is Amber phone number [phone].    Patient has past history of deafness.  She has chronic gout which is intermittent, vitamin D deficiency    She has been deaf since birth, her   and she lives by herself but has 3 kids in the area.  Came to [place] about 13 years ago.    Labs from last year showed Hemoccults negative vitamin D 8.6 cholesterol 199 HDL 42  uric acid 6.2 BMP is normal    She ran out of all of her medicines in the last couple months.  She has elevated blood pressure today    Also has major depression symptoms and diagnosis.  She had a PHQ 9 score of 21 but her #9 score regarding thoughts of being better off dead or hurting herself was 0.  She has been off her fluoxetine.    I did check labs today as a baseline including CMP lipid uric acid and vitamin D all contact her daughter regarding the results.  I also have the patient come back in 3 weeks    I restarted medications of amlodipine 10 mg a day vitamin D 2000 units daily fluoxetine 10 mg a day ibuprofen to use as needed for gout or joint pain she had no active symptoms.    Additionally she has been on some food supplement/ensure 1 can daily.    She states that she gets the gout pain in her feet.    No additional concerns or issues at this time.  Recheck in 3 weeks    Tobacco status: She  reports that she has never smoked. She does not have any smokeless tobacco history on file.    Patient Active Problem List    Diagnosis Date Noted     Chronic pain of left knee 2017     Essential hypertension with goal blood pressure less than 140/90 2017     Chronic gout of ankle, unspecified cause,  "unspecified laterality 01/31/2017     Anorexia 02/19/2016     Deaf 01/30/2016     Chronic major depressive disorder 01/29/2016     Vitamin D deficiency 01/29/2016     Seasonal allergies 01/29/2016       Current Outpatient Prescriptions   Medication Sig Dispense Refill     amLODIPine (NORVASC) 10 MG tablet TAKE 1 TABLET DAILY FOR HIGH BLOOD PRESSURE // IB HNUB NOJ IB LUB PAB KAY NTSHAV SIAB 30 tablet 5     blood pressure test kit-medium Kit        cholecalciferol, vitamin D3, (VITAMIN D3) 2,000 unit capsule TAKE 1 CAPSULE BY MOUTH DAILY // IB HNUB NOJ IB LUB 30 capsule 5     FLUoxetine (PROZAC) 10 MG capsule TAKE 1 CAPSULE BY MOUTH ONCE DAILY FOR DEPRESSION // IB HNUB NOJ 1 LUB PAB KAY NYUAB SIAB 30 capsule 5     food supplemt, lactose-reduced (ENSURE ORIGINAL) Liqd Drink 1 can daily 30 Can 5     ibuprofen (ADVIL,MOTRIN) 400 MG tablet 1 po tid as needed for gout pain 40 tablet 1     No current facility-administered medications for this visit.        ROS: 10 point review of systems negative other than as outlined above    Objective:    /76  Pulse 66  Temp 97.4  F (36.3  C) (Oral)   Resp 20  Ht 4' 10.25\" (1.48 m)  Wt 135 lb (61.2 kg)  LMP 01/29/2005  SpO2 95% Comment: at rest with room air  BMI 27.97 kg/m2  Body mass index is 27.97 kg/(m^2).      General appearance no acute distress    She signs with her daughter.   was also here.    Vital signs with elevated blood pressure otherwise normal    Neck without adenopathy oropharynx is clear canals and TMs normal    Lungs are clear no rales or rhonchi, heart was regular S1-S2 rate in the 60s    Abdomen soft nontender back without tenderness    Extremities without edema skin was normal.  No joint redness warmth or swelling no obvious gouty changes.    Blood work pending his vitamin D    Additional labs showed  uric acid 5.0 normal CMP    Results for orders placed or performed in visit on 12/26/17   Comprehensive Metabolic Panel   Result Value " Ref Range    Sodium 141 136 - 145 mmol/L    Potassium 4.4 3.5 - 5.0 mmol/L    Chloride 102 98 - 107 mmol/L    CO2 29 22 - 31 mmol/L    Anion Gap, Calculation 10 5 - 18 mmol/L    Glucose 76 70 - 125 mg/dL    BUN 12 8 - 22 mg/dL    Creatinine 0.70 0.60 - 1.10 mg/dL    GFR MDRD Af Amer >60 >60 mL/min/1.73m2    GFR MDRD Non Af Amer >60 >60 mL/min/1.73m2    Bilirubin, Total 0.4 0.0 - 1.0 mg/dL    Calcium 9.7 8.5 - 10.5 mg/dL    Protein, Total 7.8 6.0 - 8.0 g/dL    Albumin 3.7 3.5 - 5.0 g/dL    Alkaline Phosphatase 115 45 - 120 U/L    AST 17 0 - 40 U/L    ALT 16 0 - 45 U/L   Lipid Cascade FASTING   Result Value Ref Range    Cholesterol 178 <=199 mg/dL    Triglycerides 176 (H) <=149 mg/dL    HDL Cholesterol 37 (L) >=50 mg/dL    LDL Calculated 106 <=129 mg/dL    Patient Fasting > 8hrs? Yes    Uric Acid   Result Value Ref Range    Uric Acid 5.0 2.0 - 7.5 mg/dL       Assessment:  1. Essential hypertension with goal blood pressure less than 140/90  amLODIPine (NORVASC) 10 MG tablet    Comprehensive Metabolic Panel   2. Vitamin D deficiency  cholecalciferol, vitamin D3, (VITAMIN D3) 2,000 unit capsule    Vitamin D, Total (25-Hydroxy)   3. Chronic gout of ankle, unspecified cause, unspecified laterality  ibuprofen (ADVIL,MOTRIN) 400 MG tablet    Uric Acid   4. Deaf     5. Essential hypertension  amLODIPine (NORVASC) 10 MG tablet    Lipid [place] FASTING   6. Chronic major depressive disorder  FLUoxetine (PROZAC) 10 MG capsule   7. Anorexia  food supplemt, lactose-reduced (ENSURE ORIGINAL) Liqd    Comprehensive Metabolic Panel   8. Poor weight gain in adult  food supplemt, lactose-reduced (ENSURE ORIGINAL) Liqd     Patient is not on any medication for blood pressure will restart amlodipine    Vitamin D deficiency, restart vitamin D 2000 units a day    Deafness    Chronic gout but uric acid only 5.0 not on any uric acid lowering agents.  Use ibuprofen as needed    Anorexia history using Ensure supplement, generic    Major  depression restart fluoxetine PHQ 9 score was 21 recheck that with next visit consider counseling    Plan: Await full labs contact daughter    Recheck in 3 weeks    This transcription uses voice recognition software, which may contain typographical errors.

## 2021-06-15 NOTE — PROGRESS NOTES
Subjective: This patient comes in for follow-up is a 62-year-old female she had a physical on 12/26/2017    I reviewed the labs BMP normal LFT normal  uric acid 5.0    Blood pressures now controlled she is on amlodipine 10 mg a day also vitamin D was low previously no on 2000 units.  We will plan to recheck vitamin D in a couple months    Patient is now on fluoxetine depression seems to be improved and wanted fill out a PHQ 9 today we will do that in 2 months as well    No additional concerns or issues.    Tobacco status: She  reports that she has never smoked. She does not have any smokeless tobacco history on file.    Patient Active Problem List    Diagnosis Date Noted     Chronic pain of left knee 02/04/2017     Essential hypertension with goal blood pressure less than 140/90 01/31/2017     Chronic gout of ankle, unspecified cause, unspecified laterality 01/31/2017     Anorexia 02/19/2016     Deaf 01/30/2016     Chronic major depressive disorder 01/29/2016     Vitamin D deficiency 01/29/2016     Seasonal allergies 01/29/2016       Current Outpatient Prescriptions   Medication Sig Dispense Refill     amLODIPine (NORVASC) 10 MG tablet TAKE 1 TABLET DAILY FOR HIGH BLOOD PRESSURE // IB HNUB NOJ IB LUB PAB KAY NTSHAV SIAB 30 tablet 5     blood pressure test kit-medium Kit        cholecalciferol, vitamin D3, (VITAMIN D3) 2,000 unit capsule TAKE 1 CAPSULE BY MOUTH DAILY // IB HNUB NOJ IB LUB 30 capsule 5     FLUoxetine (PROZAC) 10 MG capsule TAKE 1 CAPSULE BY MOUTH ONCE DAILY FOR DEPRESSION // IB HNUB NOJ 1 LUB PAB KAY NYUAB SIAB 30 capsule 5     food supplemt, lactose-reduced (ENSURE ORIGINAL) Liqd Drink 1 can daily 30 Can 5     ibuprofen (ADVIL,MOTRIN) 400 MG tablet 1 po tid as needed for gout pain 40 tablet 1     No current facility-administered medications for this visit.        ROS:   10 point review of systems negative other than as outlined above    Objective:    /80 (Patient Site: Left Arm, Patient  "Position: Sitting, Cuff Size: Adult Regular)  Pulse 68  Temp 97.5  F (36.4  C) (Oral)   Ht 4' 10.5\" (1.486 m)  Wt 138 lb 0.5 oz (62.6 kg)  LMP 01/29/2005  BMI 28.36 kg/m2  Body mass index is 28.36 kg/(m^2).  The following are part of a depression follow up plan for the patient:  under care of mental health team    General appearance no acute distress    Vital signs are stable as outlined labs reviewed from below.    Results for orders placed or performed in visit on 12/26/17   Comprehensive Metabolic Panel   Result Value Ref Range    Sodium 141 136 - 145 mmol/L    Potassium 4.4 3.5 - 5.0 mmol/L    Chloride 102 98 - 107 mmol/L    CO2 29 22 - 31 mmol/L    Anion Gap, Calculation 10 5 - 18 mmol/L    Glucose 76 70 - 125 mg/dL    BUN 12 8 - 22 mg/dL    Creatinine 0.70 0.60 - 1.10 mg/dL    GFR MDRD Af Amer >60 >60 mL/min/1.73m2    GFR MDRD Non Af Amer >60 >60 mL/min/1.73m2    Bilirubin, Total 0.4 0.0 - 1.0 mg/dL    Calcium 9.7 8.5 - 10.5 mg/dL    Protein, Total 7.8 6.0 - 8.0 g/dL    Albumin 3.7 3.5 - 5.0 g/dL    Alkaline Phosphatase 115 45 - 120 U/L    AST 17 0 - 40 U/L    ALT 16 0 - 45 U/L   Lipid Cascade FASTING   Result Value Ref Range    Cholesterol 178 <=199 mg/dL    Triglycerides 176 (H) <=149 mg/dL    HDL Cholesterol 37 (L) >=50 mg/dL    LDL Calculated 106 <=129 mg/dL    Patient Fasting > 8hrs? Yes    Uric Acid   Result Value Ref Range    Uric Acid 5.0 2.0 - 7.5 mg/dL   Vitamin D, Total (25-Hydroxy)   Result Value Ref Range    Vitamin D, Total (25-Hydroxy) 11.5 (L) 30.0 - 80.0 ng/mL       Assessment:  1. Essential hypertension with goal blood pressure less than 140/90     2. Vitamin D deficiency     3. Chronic major depressive disorder       Total time with patient was 15 minutes over 10 minutes spent in counseling reviewing results discussing meds and coordinating care    Plan: As discussed above recheck 2 months check PHQ 9 check vitamin D level recheck blood pressure    This transcription uses voice " recognition software, which may contain typographical errors.

## 2021-06-16 PROBLEM — F32.1 MODERATE MAJOR DEPRESSION (H): Status: ACTIVE | Noted: 2020-02-05

## 2021-06-19 NOTE — LETTER
Letter by Jaswant Cook RN at      Author: Jaswant Cook RN Service: -- Author Type: --    Filed:  Encounter Date: 11/15/2019 Status: Signed         November 15, 2019    MADDY ANDRE  [address]  [place] [address]        Dear  Ma,    Welcome to [facility] Care Plus (MSC+) health program. My name is Jaswant Cook RN. I am your MSC+ care coordinator.     I will call you soon to see how you are doing and determine what needs you may have. My job is to help connect you to services, complete an assessment, and develop a care plan with you. There is no charge to you for the care coordination and assessment services. Our goal is to keep you as healthy and independent as possible.     [facility]+ includes the benefits you may receive from Medical Assistance.    Soon, you will receive a new MSC+ member identification (ID) card from [facility]. When you receive it, please use this card along with your [place] Health Care Programs card and Prescription Drug Coverage Program card. When you receive, it please use this card where you get your health services. If you have Medicare, you will need to show your Medicare card when you get health services.    If you have questions, please call me at [phone]. If you reach my voice mail, leave a message and your phone number. If you are hearing impaired, please call the [place] Relay at 898 or [phone] (cntirs-pi-jnvqjb relay service).    Sincerely,        Jaswant Cook RN    E-mail: [email or web address]  Phone: [phone]      [facility]+ [id number] DHS Approved ([id number])  [id number] (11/18)

## 2021-06-19 NOTE — LETTER
Letter by Hector Banda MD at      Author: Hector Banda MD Service: -- Author Type: --    Filed:  Encounter Date: 8/21/2019 Status: (Other)         Sivakumar Ernandez  [address]  [place] [address]      August 21, 2019      Dear Ma,    As a valued [facility] patient, your healthcare needs are our priority.  Your health care team has determined that you are due for an appointment regarding your Blood Pressure.    To help prevent delays in your care, please call the [facility] at [phone].    We look forward to partnering with you to achieve optimal health and wellbeing.    Sincerely,  Your care team at [facility] and [facility]

## 2021-06-21 NOTE — LETTER
Letter by Alexandra Ortiz SW at      Author: Alexandra Ortiz SW Service: -- Author Type: --    Filed:  Encounter Date: 10/26/2020 Status: (Other)             October 26, 2020    MADDY ANDRE  [address]  [place] [address]      Dear Ma:    As a member of [facility] Plus (MSC+) you are provided a care coordinator. I will be your new care coordinator as of 11/1/2020. I will be calling you soon to see how you are doing and determine your needs.    If you have any questions, please feel free to call me at [phone]. If you reach my voice mail, please leave a message and your phone number. If you are hearing impaired, please call the [place] Relay at 707 or [phone] (znlcug-gh-psguow relay service).    I look forward to speaking with you soon.    Sincerely,          DHRUV Hidalgo    E-mail: [email or web address]  [phone]      Ranjeet Espinoza          MSC+ [facility]  [id number] DHS Approved ([id number])  [id number] (11/18)

## 2021-06-22 NOTE — PROGRESS NOTES
Subjective: Patient comes in with her daughter,Amber.  She is here for a physical/wellness visit.    Patient does not want a Pap smear does not want a mammogram is now on colonoscopy we did discuss [place] guard and did refer her for that.    Patient was seen a year ago she has depression she has been on fluoxetine for a while but did not think it helped.  We discussed another medication she is not sure she can to try it her daughter felt that we should try prescribe some sertraline 50 mg with refills we will see how that goes    Blood pressure was elevated she did not take her medication today.  She is on 10 mg of amlodipine    Pain her.  Also a prescription for vitamin D    She denies seeing any other physicians.    She did not want a breast exam pelvic exam or rectal.    They wanted refills for the year and I did do that    Patient's had previous history of gout I did check uric acid no active symptoms    PHQ 9 score was 19.        Tobacco status: She  reports that  has never smoked. she has never used smokeless tobacco.    Patient Active Problem List    Diagnosis Date Noted     Chronic pain of left knee 02/04/2017     Essential hypertension with goal blood pressure less than 140/90 01/31/2017     Chronic gout of ankle, unspecified cause, unspecified laterality 01/31/2017     Anorexia 02/19/2016     Deaf 01/30/2016     Chronic major depressive disorder 01/29/2016     Vitamin D deficiency 01/29/2016     Seasonal allergies 01/29/2016       Current Outpatient Medications   Medication Sig Dispense Refill     amLODIPine (NORVASC) 10 MG tablet TAKE 1 TABLET DAILY FOR HIGH BLOOD PRESSURE // IB HNUB NOJ IB LUB PAB KAY NTSHAV SIAB. 30 tablet 11     blood pressure test kit-medium Kit        cholecalciferol, vitamin D3, (VITAMIN D3) 2,000 unit capsule TAKE 1 CAPSULE BY MOUTH DAILY // IB HNUB NOJ IB LUB. 30 capsule 11     ENSURE DRINK 1 BOTTLE BY MOUTH DAILY // IB HNUB HAUS 1 POOM 5688 mL 5     cetirizine (ZYRTEC) 10 MG tablet  "Take 1 tablet (10 mg total) by mouth daily. 30 tablet 5     ibuprofen (ADVIL,MOTRIN) 400 MG tablet 1 po tid as needed for gout pain. 40 tablet 5     sertraline (ZOLOFT) 50 MG tablet Take 1 tablet (50 mg total) by mouth daily. 30 tablet 11     No current facility-administered medications for this visit.        ROS:   10 point review of systems negative other than as outlined    Objective:    /88 (Patient Site: Left Arm, Patient Position: Sitting, Cuff Size: Adult Regular)   Pulse 60   Resp 16   Ht 4' 10\" (1.473 m)   Wt 141 lb (64 kg)   LMP 01/29/2005   BMI 29.47 kg/m    Body mass index is 29.47 kg/m .      General appearance no acute distress    HEENT neck is supple no adenopathy oropharynx is clear pupils react normally extract movements full    Lungs are clear no rales or rhonchi heart rate was at 60 elevated blood pressure as outlined    Extremities without edema pulses normal    Skin was normal no rashes    Abdomen was soft bowel sounds are normal no guarding or rebound.    No joint redness warmth or swelling no gouty changes.    Labs showed normal CMP  TSH normal    CBC was normal    Vitamin D pending, uric acid 6.4    Results for orders placed or performed in visit on 12/03/18   Comprehensive Metabolic Panel   Result Value Ref Range    Sodium 141 136 - 145 mmol/L    Potassium 4.0 3.5 - 5.0 mmol/L    Chloride 102 98 - 107 mmol/L    CO2 29 22 - 31 mmol/L    Anion Gap, Calculation 10 5 - 18 mmol/L    Glucose 88 70 - 125 mg/dL    BUN 13 8 - 22 mg/dL    Creatinine 0.78 0.60 - 1.10 mg/dL    GFR MDRD Af Amer >60 >60 mL/min/1.73m2    GFR MDRD Non Af Amer >60 >60 mL/min/1.73m2    Bilirubin, Total 0.5 0.0 - 1.0 mg/dL    Calcium 9.5 8.5 - 10.5 mg/dL    Protein, Total 7.7 6.0 - 8.0 g/dL    Albumin 3.7 3.5 - 5.0 g/dL    Alkaline Phosphatase 107 45 - 120 U/L    AST 18 0 - 40 U/L    ALT 20 0 - 45 U/L   Lipid Cascade FASTING   Result Value Ref Range    Cholesterol 186 <=199 mg/dL    Triglycerides 142 <=149 " mg/dL    HDL Cholesterol 45 (L) >=50 mg/dL    LDL Calculated 113 <=129 mg/dL    Patient Fasting > 8hrs? Yes    Thyroid Stimulating Hormone (TSH)   Result Value Ref Range    TSH 0.79 0.30 - 5.00 uIU/mL   HM2(CBC w/o Differential)   Result Value Ref Range    WBC 5.7 4.0 - 11.0 thou/uL    RBC 4.95 3.80 - 5.40 mill/uL    Hemoglobin 14.0 12.0 - 16.0 g/dL    Hematocrit 42.8 35.0 - 47.0 %    MCV 86 80 - 100 fL    MCH 28.3 27.0 - 34.0 pg    MCHC 32.7 32.0 - 36.0 g/dL    RDW 12.2 11.0 - 14.5 %    Platelets 214 140 - 440 thou/uL    MPV 8.8 7.0 - 10.0 fL   Uric Acid   Result Value Ref Range    Uric Acid 6.4 2.0 - 7.5 mg/dL       Assessment:  1. Medicare annual wellness visit, subsequent  Comprehensive Metabolic Panel    Lipid [place]ascade FASTING    Thyroid Stimulating Hormone (TSH)    Vitamin D, Total (25-Hydroxy)    HM2(CBC w/o Differential)    Cologuard   2. Chronic gout of ankle, unspecified cause, unspecified laterality  ibuprofen (ADVIL,MOTRIN) 400 MG tablet    Uric Acid   3. Essential hypertension with goal blood pressure less than 140/90  amLODIPine (NORVASC) 10 MG tablet   4. Deafness, unspecified laterality     5. Vitamin D deficiency  cholecalciferol, vitamin D3, (VITAMIN D3) 2,000 unit capsule   6. Chronic major depressive disorder  sertraline (ZOLOFT) 50 MG tablet   7. Essential hypertension  amLODIPine (NORVASC) 10 MG tablet   8. Seasonal allergies  cetirizine (ZYRTEC) 10 MG tablet     Wellness visit/physical relatively stable    History of gout no active symptoms uric acid look normal and use ibuprofen for degenerative arthritis or gout flares    Hypertension needs to get back on amlodipine    Vitamin D get back on 2000 units of vitamin D    She does have underlying problems with deafness and this is frustrating for her.    Major depression not effective on fluoxetine will try sertraline    Seasonal allergies refill on cetirizine    Plan: As outlined above he also offered counseling but she did not want to do  that    I will contact her daughter with the results on the blood test    This transcription uses voice recognition software, which may contain typographical errors.

## 2021-06-23 NOTE — TELEPHONE ENCOUNTER
"Clinic recently received papers from [facility] for provider to fill out a health care summary for pt.     Provider would like to see pt before he can fill out this form, so called pt to inform her of this. Spoke to her daughter, who stated that pt already had an AWV/physical on 12/03/2018, so she was wondering if provider can use the visit from 12/03/2018 since pt \"comes for a check-up every 6 months anyway.\"     Please advise. Thanks.    "

## 2021-06-23 NOTE — TELEPHONE ENCOUNTER
Refill Approved    Rx renewed per Medication Renewal Policy. Medication was last renewed on 12/3/18.    Violet Flores, [facility] Triage/Med Refill 2/5/2019     Requested Prescriptions   Pending Prescriptions Disp Refills     amLODIPine (NORVASC) 10 MG tablet 90 tablet 3     Sig: TAKE 1 TABLET DAILY FOR HIGH BLOOD PRESSURE // IB HNUB NOJ IB LUB PAB KAY NTSHAV SIAB    Calcium-Channel Blockers Protocol Passed - 2/5/2019  4:33 PM       Passed - PCP or prescribing provider visit in past 12 months or next 3 months    Last office visit with prescriber/PCP: 1/18/2018 Hector Banda MD OR same dept: Visit date not found OR same specialty: 1/18/2018 Hector Banda MD  Last physical: 12/3/2018 Last MTM visit: Visit date not found   Next visit within 3 mo: Visit date not found  Next physical within 3 mo: Visit date not found  Prescriber OR PCP: Hector Banda MD  Last diagnosis associated with med order: 1. Essential hypertension  - amLODIPine (NORVASC) 10 MG tablet; TAKE 1 TABLET DAILY FOR HIGH BLOOD PRESSURE // IB HNUB NOJ IB LUB PAB KAY NTSHAV SIAB  Dispense: 90 tablet; Refill: 3    2. Essential hypertension with goal blood pressure less than 140/90  - amLODIPine (NORVASC) 10 MG tablet; TAKE 1 TABLET DAILY FOR HIGH BLOOD PRESSURE // IB HNUB NOJ IB LUB PAB KAY NTSHAV SIAB  Dispense: 90 tablet; Refill: 3    3. Vitamin D deficiency  - cholecalciferol, vitamin D3, (VITAMIN D3) 2,000 unit capsule; TAKE 1 CAPSULE BY MOUTH DAILY // IB HNUB NOJ IB LUB  Dispense: 90 capsule; Refill: 3    If protocol passes may refill for 12 months if within 3 months of last provider visit (or a total of 15 months).            Passed - Blood pressure filed in past 12 months    BP Readings from Last 1 Encounters:   12/03/18 152/88             cholecalciferol, vitamin D3, (VITAMIN D3) 2,000 unit capsule 90 capsule 3     Sig: TAKE 1 CAPSULE BY MOUTH DAILY // IB HNUB NOJ IB LUB    There is no refill protocol information for this order

## 2021-06-23 NOTE — TELEPHONE ENCOUNTER
Forms were successfully faxed out to [facility].     Dtr was also notified that forms were faxed out. No additional questions.

## 2021-10-19 PROBLEM — F32.9 MAJOR DEPRESSION: Status: ACTIVE | Noted: 2020-02-05

## 2021-10-20 ENCOUNTER — TELEPHONE (OUTPATIENT)
Dept: FAMILY MEDICINE | Facility: CLINIC | Age: 66
End: 2021-10-20

## 2021-11-11 ENCOUNTER — MEDICAL CORRESPONDENCE (OUTPATIENT)
Dept: HEALTH INFORMATION MANAGEMENT | Facility: CLINIC | Age: 66
End: 2021-11-11

## 2021-11-11 ENCOUNTER — OFFICE VISIT (OUTPATIENT)
Dept: FAMILY MEDICINE | Facility: CLINIC | Age: 66
End: 2021-11-11
Payer: COMMERCIAL

## 2021-11-11 VITALS
DIASTOLIC BLOOD PRESSURE: 80 MMHG | SYSTOLIC BLOOD PRESSURE: 168 MMHG | WEIGHT: 139 LBS | OXYGEN SATURATION: 99 % | BODY MASS INDEX: 29.05 KG/M2 | HEART RATE: 73 BPM | TEMPERATURE: 96.5 F

## 2021-11-11 DIAGNOSIS — Z12.31 ENCOUNTER FOR SCREENING MAMMOGRAM FOR BREAST CANCER: ICD-10-CM

## 2021-11-11 DIAGNOSIS — Z11.59 NEED FOR HEPATITIS B SCREENING TEST: ICD-10-CM

## 2021-11-11 DIAGNOSIS — R32 URINARY INCONTINENCE, UNSPECIFIED TYPE: ICD-10-CM

## 2021-11-11 DIAGNOSIS — J30.2 SEASONAL ALLERGIES: ICD-10-CM

## 2021-11-11 DIAGNOSIS — I10 ESSENTIAL HYPERTENSION: Primary | ICD-10-CM

## 2021-11-11 DIAGNOSIS — Z23 NEED FOR PROPHYLACTIC VACCINATION AND INOCULATION AGAINST INFLUENZA: ICD-10-CM

## 2021-11-11 DIAGNOSIS — Z12.11 COLON CANCER SCREENING: ICD-10-CM

## 2021-11-11 DIAGNOSIS — F32.9 CHRONIC MAJOR DEPRESSIVE DISORDER: ICD-10-CM

## 2021-11-11 DIAGNOSIS — Z11.59 NEED FOR HEPATITIS C SCREENING TEST: ICD-10-CM

## 2021-11-11 DIAGNOSIS — E55.9 VITAMIN D DEFICIENCY: ICD-10-CM

## 2021-11-11 LAB
ANION GAP SERPL CALCULATED.3IONS-SCNC: 11 MMOL/L (ref 5–18)
BUN SERPL-MCNC: 12 MG/DL (ref 8–22)
CALCIUM SERPL-MCNC: 9.8 MG/DL (ref 8.5–10.5)
CHLORIDE BLD-SCNC: 102 MMOL/L (ref 98–107)
CHOLEST SERPL-MCNC: 176 MG/DL
CO2 SERPL-SCNC: 28 MMOL/L (ref 22–31)
CREAT SERPL-MCNC: 0.76 MG/DL (ref 0.6–1.1)
FASTING STATUS PATIENT QL REPORTED: ABNORMAL
GFR SERPL CREATININE-BSD FRML MDRD: 81 ML/MIN/1.73M2
GLUCOSE BLD-MCNC: 85 MG/DL (ref 70–125)
HDLC SERPL-MCNC: 48 MG/DL
LDLC SERPL CALC-MCNC: 103 MG/DL
POTASSIUM BLD-SCNC: 4.2 MMOL/L (ref 3.5–5)
SODIUM SERPL-SCNC: 141 MMOL/L (ref 136–145)
T4 FREE SERPL-MCNC: 1.16 NG/DL (ref 0.7–1.8)
TRIGL SERPL-MCNC: 126 MG/DL
TSH SERPL DL<=0.005 MIU/L-ACNC: 0.16 UIU/ML (ref 0.3–5)

## 2021-11-11 PROCEDURE — 86803 HEPATITIS C AB TEST: CPT | Performed by: FAMILY MEDICINE

## 2021-11-11 PROCEDURE — 84439 ASSAY OF FREE THYROXINE: CPT | Performed by: FAMILY MEDICINE

## 2021-11-11 PROCEDURE — 36415 COLL VENOUS BLD VENIPUNCTURE: CPT | Performed by: FAMILY MEDICINE

## 2021-11-11 PROCEDURE — 80061 LIPID PANEL: CPT | Performed by: FAMILY MEDICINE

## 2021-11-11 PROCEDURE — 90471 IMMUNIZATION ADMIN: CPT | Performed by: FAMILY MEDICINE

## 2021-11-11 PROCEDURE — 86706 HEP B SURFACE ANTIBODY: CPT | Performed by: FAMILY MEDICINE

## 2021-11-11 PROCEDURE — 84443 ASSAY THYROID STIM HORMONE: CPT | Performed by: FAMILY MEDICINE

## 2021-11-11 PROCEDURE — 80048 BASIC METABOLIC PNL TOTAL CA: CPT | Performed by: FAMILY MEDICINE

## 2021-11-11 PROCEDURE — 0064A COVID-19,PF,MODERNA (18+ YRS BOOSTER .25ML): CPT | Performed by: FAMILY MEDICINE

## 2021-11-11 PROCEDURE — 87340 HEPATITIS B SURFACE AG IA: CPT | Performed by: FAMILY MEDICINE

## 2021-11-11 PROCEDURE — 99214 OFFICE O/P EST MOD 30 MIN: CPT | Mod: 25 | Performed by: FAMILY MEDICINE

## 2021-11-11 PROCEDURE — 91306 COVID-19,PF,MODERNA (18+ YRS BOOSTER .25ML): CPT | Performed by: FAMILY MEDICINE

## 2021-11-11 PROCEDURE — 90686 IIV4 VACC NO PRSV 0.5 ML IM: CPT | Performed by: FAMILY MEDICINE

## 2021-11-11 RX ORDER — AMLODIPINE BESYLATE 10 MG/1
10 TABLET ORAL DAILY
Qty: 90 TABLET | Refills: 1 | Status: SHIPPED | OUTPATIENT
Start: 2021-11-11 | End: 2024-02-29

## 2021-11-11 RX ORDER — CETIRIZINE HYDROCHLORIDE 10 MG/1
10 TABLET ORAL DAILY
Qty: 30 TABLET | Refills: 5 | Status: SHIPPED | OUTPATIENT
Start: 2021-11-11 | End: 2024-02-29

## 2021-11-11 RX ORDER — DULOXETIN HYDROCHLORIDE 30 MG/1
30 CAPSULE, DELAYED RELEASE ORAL DAILY
Qty: 30 CAPSULE | Refills: 3 | Status: SHIPPED | OUTPATIENT
Start: 2021-11-11 | End: 2022-01-04

## 2021-11-11 RX ORDER — BLOOD PRESSURE TEST KIT-MEDIUM
KIT MISCELLANEOUS
Status: CANCELLED | OUTPATIENT
Start: 2021-11-11

## 2021-11-11 RX ORDER — IBUPROFEN 400 MG/1
TABLET, FILM COATED ORAL
Qty: 40 TABLET | Refills: 5 | Status: CANCELLED | OUTPATIENT
Start: 2021-11-11

## 2021-11-11 RX ORDER — LOSARTAN POTASSIUM 50 MG/1
50 TABLET ORAL DAILY
Qty: 30 TABLET | Refills: 3 | Status: SHIPPED | OUTPATIENT
Start: 2021-11-11 | End: 2021-12-09

## 2021-11-11 RX ORDER — ACETAMINOPHEN 500 MG
1000 TABLET ORAL EVERY 6 HOURS PRN
Qty: 100 TABLET | Refills: 2 | Status: SHIPPED | OUTPATIENT
Start: 2021-11-11 | End: 2024-02-29

## 2021-11-11 RX ORDER — NUTRIT SUPP/INULIN/FOS/FIBER 0.05G-1.06
LIQUID (ML) ORAL
Qty: 5688 ML | Refills: 5 | Status: CANCELLED | OUTPATIENT
Start: 2021-11-11

## 2021-11-11 NOTE — Clinical Note
Please schedule 2 week follow up visit for BP  Can be virtual if they have ability to check BP at home.

## 2021-11-11 NOTE — PROGRESS NOTES
Subjective:  66 year old female with concerns of follow up  Probably not taking HTN meds.  No clinic visit since 2/5/2020.  Needs visit and signatures for PCA help.  daughter with her today.  Provides sign language interpretation and some history.    Hoping for commode chair as well.  Hard to move between upstairs bedroom and lower floors where the bathroom is.    Outpatient Medications Prior to Visit   Medication Sig Dispense Refill     blood pressure test kit-medium Kit [BLOOD PRESSURE TEST KIT-MEDIUM KIT]        food supplemt, lactose-reduced (ENSURE) Liqd [FOOD SUPPLEMT, LACTOSE-REDUCED (ENSURE) LIQD] DRINK 1 BOTTLE BY MOUTH DAILY // IB HNUB HAUS 1 POOM 5,688 mL 5     ibuprofen (ADVIL,MOTRIN) 400 MG tablet [IBUPROFEN (ADVIL,MOTRIN) 400 MG TABLET] 1 po tid as needed for gout pain. 40 tablet 5     acetaminophen (TYLENOL EXTRA STRENGTH) 500 MG tablet [ACETAMINOPHEN (TYLENOL EXTRA STRENGTH) 500 MG TABLET] Take 2 tablets (1,000 mg total) by mouth every 6 (six) hours as needed for pain. 100 tablet 2     amLODIPine (NORVASC) 10 MG tablet [AMLODIPINE (NORVASC) 10 MG TABLET] TAKE 1 TABLET DAILY FOR HIGH BLOOD PRESSURE // IB HNUB NOJ IB LUB PAB KAY NTSHAV SIAB 90 tablet 3     cetirizine (ZYRTEC) 10 MG tablet [CETIRIZINE (ZYRTEC) 10 MG TABLET] Take 1 tablet (10 mg total) by mouth daily. 30 tablet 5     cholecalciferol, vitamin D3, (VITAMIN D3) 2,000 unit capsule [CHOLECALCIFEROL, VITAMIN D3, (VITAMIN D3) 2,000 UNIT CAPSULE] TAKE 1 CAPSULE BY MOUTH DAILY // IB HNUB NOJ IB LUB 90 capsule 3     DULoxetine (CYMBALTA) 30 MG capsule [DULOXETINE (CYMBALTA) 30 MG CAPSULE] Take 1 capsule (30 mg total) by mouth daily. 30 capsule 3     losartan (COZAAR) 50 MG tablet [LOSARTAN (COZAAR) 50 MG TABLET] Take 1 tablet (50 mg total) by mouth daily. 30 tablet 3     multivitamin (ONE A DAY) per tablet [MULTIVITAMIN (ONE A DAY) PER TABLET] Take 1 tablet by mouth daily. 120 tablet 2     No facility-administered medications prior to visit.       History   Smoking Status     Never Smoker   Smokeless Tobacco     Never Used      Objective:  BP (!) 168/80 (BP Location: Right arm, Patient Position: Sitting, Cuff Size: Adult Small)   Pulse 73   Temp (!) 96.5  F (35.8  C) (Temporal)   Wt 63 kg (139 lb)   SpO2 99%   BMI 29.05 kg/m    GENERAL: alert, not distressed  CHEST: clear, no rales, rhonchi, or wheezes  CARDIAC: regular without murmur, gallop, or rub  ABDOMEN: soft, non tender, non distended, normal bowel sounds    Assessment and Plan:   1. Essential hypertension  BP not controlled.  She is likely not taking the medicine she has prescribed as she reports two medicines per day.  - amLODIPine (NORVASC) 10 MG tablet; Take 1 tablet (10 mg) by mouth daily  Dispense: 90 tablet; Refill: 1  - losartan 50  - Basic metabolic panel  (Ca, Cl, CO2, Creat, Gluc, K, Na, BUN)  - TSH with free T4 reflex  - Lipid panel reflex to direct LDL Fasting  - Home Blood Pressure Monitor Order for DME - ONLY FOR DME    2. Seasonal allergies  - cetirizine (ZYRTEC) 10 MG tablet; Take 1 tablet (10 mg) by mouth daily  Dispense: 30 tablet; Refill: 5    3. Vitamin D deficiency  - cholecalciferol 50 MCG (2000 UT) CAPS; Take 2,000 Units by mouth daily  Dispense: 90 capsule; Refill: 3  - Multiple Vitamin (MULTI VITAMIN) TABS; Take 1 tablet by mouth daily  Dispense: 120 tablet; Refill: 2    4. Chronic major depressive disorder  Hard to tell if she has been on this or not.  Will renew.  - DULoxetine (CYMBALTA) 30 MG capsule; Take 1 capsule (30 mg) by mouth daily  Dispense: 30 capsule; Refill: 3    6. Urinary incontinence, unspecified type  Needs rx for gloves for incontinence changes.  Signed forms for her home health/PCA  - NON-STERILE GLOVES  - Commode Chair Order for DME - ONLY FOR DME    7. Need for prophylactic vaccination and inoculation against influenza  - FLU VACCINE, INCREASED ANTIGEN, PRESV FREE, AGE 65+ [16281]    8. Colon cancer screening  Discussed options  They wanted stool  based test.  - MATTHIAS([employer])    9. Need for hepatitis C screening test  Patient agreed to screening  - Hepatitis C Screen Reflex to HCV RNA Quant and Genotype    10. Need for hepatitis B screening test  Patient agreed to screening  - Hepatitis B Surface Antibody  - Hepatitis B surface antigen    11. Encounter for screening mammogram for breast cancer  Did not want this today.    Will order for the future.  - *MA Screening Digital Bilateral; Future     COVID booster shot provided today (moderna).    Follow up in 1-2 weeks for BP recheck.

## 2021-11-12 LAB
HBV SURFACE AB SERPLBLD QL IA.RAPID: NEGATIVE
HBV SURFACE AG SERPL QL IA: NONREACTIVE
HCV AB SERPL QL IA: NONREACTIVE

## 2021-11-14 ENCOUNTER — HEALTH MAINTENANCE LETTER (OUTPATIENT)
Age: 66
End: 2021-11-14

## 2021-11-16 NOTE — PROGRESS NOTES
Spoke with PT daughter @ [phone], offered to help schedule appt for PT with daughter, asked daughter if PT have BP monitor at home, per daughter PT does not have BP monitor yet, but does have the prescription Dr. Rendon wrote for PT. Daughter have not had a chance to  BP monitor yet. Per PT daughter she will call in to schedule the virtual appt once daughter  the BP monitor. Will postpone out 1 week and contact PT daughter again if appt is not schedule yet.

## 2021-11-22 ENCOUNTER — TELEPHONE (OUTPATIENT)
Dept: FAMILY MEDICINE | Facility: CLINIC | Age: 66
End: 2021-11-22
Payer: COMMERCIAL

## 2021-11-22 NOTE — TELEPHONE ENCOUNTER
----- Message from Néstor Rendon MD sent at 11/20/2021 11:24 AM CST -----  Call:Thyroid test shows some overactivity of thyroid gland.  This could be contributing to elevated blood pressure.  I would repeat these labs fairly soon.  Please schedule clinic appointment for 1 to 2 weeks from now.    Also, could get hepatitis B vaccines as it looks like you do not have protection against that virus.

## 2021-11-22 NOTE — TELEPHONE ENCOUNTER
RN attempted to contact patient, but no answer and unable to leave a message.  Will try patient later.    Nicolle Fritz RN  [facility]

## 2021-11-22 NOTE — LETTER
December 3, 2021      Sivakumar Ernandez  [address]  [place] [address]              Dear Ma,    Thyroid test shows some overactivity of thyroid gland.  This could be contributing to elevated blood pressure.  I would repeat these labs fairly soon.  Please schedule clinic appointment for 1 to 2 weeks from now.    Also, could get hepatitis B vaccines as it looks like you do not have protection against that virus.        Sincerely,      Nicolle [facility] RN

## 2021-12-03 NOTE — TELEPHONE ENCOUNTER
RN attempted to contact patient, but no answer.   Unable to leave a message    Letter mailed to patient's home address.      Nicolle BEASLEY, [facility]

## 2021-12-09 ENCOUNTER — OFFICE VISIT (OUTPATIENT)
Dept: FAMILY MEDICINE | Facility: CLINIC | Age: 66
End: 2021-12-09
Payer: COMMERCIAL

## 2021-12-09 VITALS
SYSTOLIC BLOOD PRESSURE: 153 MMHG | HEART RATE: 75 BPM | HEIGHT: 58 IN | RESPIRATION RATE: 20 BRPM | BODY MASS INDEX: 28.76 KG/M2 | WEIGHT: 137 LBS | DIASTOLIC BLOOD PRESSURE: 96 MMHG

## 2021-12-09 DIAGNOSIS — Z60.3 LANGUAGE BARRIER: ICD-10-CM

## 2021-12-09 DIAGNOSIS — G44.229 CHRONIC TENSION-TYPE HEADACHE, NOT INTRACTABLE: ICD-10-CM

## 2021-12-09 DIAGNOSIS — Z75.8 LANGUAGE BARRIER: ICD-10-CM

## 2021-12-09 DIAGNOSIS — I10 ESSENTIAL HYPERTENSION: Primary | ICD-10-CM

## 2021-12-09 DIAGNOSIS — Z12.11 COLON CANCER SCREENING: ICD-10-CM

## 2021-12-09 PROCEDURE — 99214 OFFICE O/P EST MOD 30 MIN: CPT | Performed by: FAMILY MEDICINE

## 2021-12-09 RX ORDER — IBUPROFEN 400 MG/1
TABLET, FILM COATED ORAL
Qty: 40 TABLET | Refills: 5 | Status: CANCELLED | OUTPATIENT
Start: 2021-12-09

## 2021-12-09 RX ORDER — LOSARTAN POTASSIUM 100 MG/1
100 TABLET ORAL DAILY
Qty: 90 TABLET | Refills: 1 | Status: SHIPPED | OUTPATIENT
Start: 2021-12-09 | End: 2024-02-29

## 2021-12-09 RX ORDER — CYCLOBENZAPRINE HCL 5 MG
5 TABLET ORAL 3 TIMES DAILY PRN
Qty: 30 TABLET | Refills: 1 | Status: SHIPPED | OUTPATIENT
Start: 2021-12-09 | End: 2024-03-01

## 2021-12-09 SDOH — SOCIAL STABILITY - SOCIAL INSECURITY: ACCULTURATION DIFFICULTY: Z60.3

## 2021-12-09 ASSESSMENT — MIFFLIN-ST. JEOR: SCORE: 1046.18

## 2021-12-09 NOTE — PROGRESS NOTES
"  Assessment & Plan     Essential hypertension  Not sufficiently controlled. May be exacerbated by headache or the cause of it. In any case recommended increasing losartan to 100 mg as below.  - losartan (COZAAR) 100 MG tablet  Dispense: 90 tablet; Refill: 1    Chronic tension-type headache, not intractable  Chronic daily headache. Think she may do well to avoid analgesics. We will try muscle relaxer to see if that can reduce some of the tension.  - cyclobenzaprine (FLEXERIL) 5 MG tablet  Dispense: 30 tablet; Refill: 1    Colon cancer screening  She submitted a Cologuard but was not able to be processed.  Daughter called the company but they would not talk to her and could only talk to the patient.  Patient cannot be communicated with in any reasonable fashion via the telephone so I will submit a new order.  - COLOGUARD([employer])    Language barrier  Daughter provides interpretation of their idiosyncratic signing  10/31/2019- PATIENT'S DAUGHTER, HARJIT ALWAYS ACCOMPANIES PATIENT TO APPTS.  PATIENT DOES NOT KNOW SIGN LANGUAGE-DAUGHTER CAN COMMUNICATE WITH PATIENT/  NOT NEEDED.-yovani      BMI:   Estimated body mass index is 28.63 kg/m  as calculated from the following:    Height as of this encounter: 1.473 m (4' 10\").    Weight as of this encounter: 62.1 kg (137 lb).     No follow-ups on file.    Néstor Rendon MD  [facility]   Ma is a 66 year old who presents for the following health issues  accompanied by her daughter.    HPI     Hypertension Follow-up      Do you check your blood pressure regularly outside of the clinic? Yes     Are you following a low salt diet? Yes    Are your blood pressures ever more than 140 on the top number (systolic) OR more   than 90 on the bottom number (diastolic), for example 140/90? Yes    Migraine     Since your last clinic visit, how have your headaches changed?  Worsened last 2 weeks HA every evening    How often are you getting " "headaches or migraines? daily     Are you able to do normal daily activities when you have a migraine? No    Are you taking rescue/relief medications? (Select all that apply) Tylenol    How helpful is your rescue/relief medication?  I get no relief    Are you taking any medications to prevent migraines? (Select all that apply)  No    In the past 4 weeks, how often have you gone to urgent care or the emergency room because of your headaches?  0             Objective    BP (!) 153/96 (BP Location: Left leg, Patient Position: Sitting, Cuff Size: Adult Regular)   Pulse 75   Resp 20   Ht 1.473 m (4' 10\")   Wt 62.1 kg (137 lb)   BMI 28.63 kg/m    Body mass index is 28.63 kg/m .  Physical Exam   GENERAL: alert, not distressed  CHEST: clear, no rales, rhonchi, or wheezes  CARDIAC: regular without murmur, gallop, or rub  PERRL-EOMI  EARS: normal tympanic membranes and external auditory canals bilaterally  PHARYNX: no erythema or exudates  MOUTH: well hydrated mucosa, no lesions  NECK: no lymphadenopathy or thyroid nodules             "

## 2021-12-10 NOTE — PROGRESS NOTES
Left message #1 at [phone]. Postponing task out to a week and will try again. If patient returns call back, please help patient schedule an appointment per message below. Thanks!

## 2021-12-17 LAB — COLOGUARD INSUFFICIENT SPECIMEN: NORMAL

## 2021-12-23 ENCOUNTER — PATIENT OUTREACH (OUTPATIENT)
Dept: GERIATRIC MEDICINE | Facility: CLINIC | Age: 66
End: 2021-12-23
Payer: COMMERCIAL

## 2021-12-23 NOTE — PROGRESS NOTES
[facility] Six-Month Telephone Assessment    6 month telephone assessment completed on 12/23/21.    ER visits: No  Hospitalizations: No  TCU stays: No  Significant health status changes: None reported.   Falls/Injuries: No  ADL/IADL changes: No  Changes in services: No    Caregiver Assessment follow up:  N/A    Goals: See POC in chart for goal progress documentation.      Will see member in 6 months for an annual health risk assessment.   Encouraged member to call CC with any questions or concerns in the meantime.       DHRUV Hidalgo  [facility]  [phone]

## 2021-12-28 ENCOUNTER — MEDICAL CORRESPONDENCE (OUTPATIENT)
Dept: HEALTH INFORMATION MANAGEMENT | Facility: CLINIC | Age: 66
End: 2021-12-28
Payer: COMMERCIAL

## 2022-01-04 ENCOUNTER — TELEPHONE (OUTPATIENT)
Dept: FAMILY MEDICINE | Facility: CLINIC | Age: 67
End: 2022-01-04

## 2022-01-04 ENCOUNTER — VIRTUAL VISIT (OUTPATIENT)
Dept: FAMILY MEDICINE | Facility: CLINIC | Age: 67
End: 2022-01-04
Payer: COMMERCIAL

## 2022-01-04 DIAGNOSIS — I10 ESSENTIAL HYPERTENSION: Primary | ICD-10-CM

## 2022-01-04 DIAGNOSIS — F32.9 CHRONIC MAJOR DEPRESSIVE DISORDER: ICD-10-CM

## 2022-01-04 DIAGNOSIS — R79.89 LOW TSH LEVEL: ICD-10-CM

## 2022-01-04 DIAGNOSIS — G44.229 CHRONIC TENSION-TYPE HEADACHE, NOT INTRACTABLE: ICD-10-CM

## 2022-01-04 PROCEDURE — 99214 OFFICE O/P EST MOD 30 MIN: CPT | Mod: 95 | Performed by: FAMILY MEDICINE

## 2022-01-04 RX ORDER — DULOXETIN HYDROCHLORIDE 30 MG/1
30 CAPSULE, DELAYED RELEASE ORAL DAILY
Qty: 90 CAPSULE | Refills: 1 | Status: SHIPPED | OUTPATIENT
Start: 2022-01-04

## 2022-01-04 RX ORDER — ATENOLOL 25 MG/1
TABLET ORAL
Qty: 180 TABLET | Refills: 1 | Status: SHIPPED | OUTPATIENT
Start: 2022-01-04 | End: 2024-02-29

## 2022-01-04 ASSESSMENT — PATIENT HEALTH QUESTIONNAIRE - PHQ9: SUM OF ALL RESPONSES TO PHQ QUESTIONS 1-9: 0

## 2022-01-04 NOTE — PROGRESS NOTES
Ma is a 66 year old who is being evaluated via a billable video visit.      How would you like to obtain your AVS? Mail a copy  If the video visit is dropped, the invitation should be resent by: Text to cell phone: [phone]  Will anyone else be joining your video visit? No      Video Start Time: 11:16 am      ICD-10-CM    1. Chronic tension-type headache, not intractable  G44.229 atenolol (TENORMIN) 25 MG tablet   2. Essential hypertension  I10 atenolol (TENORMIN) 25 MG tablet   3. Chronic major depressive disorder  F32.9 DULoxetine (CYMBALTA) 30 MG capsule   4. Low TSH level  R79.89 TSH     T4, free     Chronic headaches, add atenolol.    Hypertension poor control add atenolol stay on losartan and amlodipine.    Major depression restart duloxetine    Low TSH needs to follow-up for free T4 and TSH.    Check nurse only blood pressure at that time as well.        Subjective   Ma is a 66 year old who presents for the following health issues     HPI   Patient follows up on blood pressure and headaches and depression.    She continues to have headache is on the top of her head sometimes pounding symptoms steady no real vision change    Her blood pressure still elevated today at 170/105 range.    Last time when she saw Dr. Rendon was 153/96.  He increased her losartan from 50 to 100 mg a day.  She has normal renal function.  She also is on amlodipine daily.    I elected to add atenolol 25 mg 1 twice a day, may help if she has some vascular component to her headache.  Should improve blood pressure as well    She did have a suppressed TSH at 0.16 this needs to get recheck she will come in for a TSH and a free T4.    She is not been taking her duloxetine for depression.  I think this may be contributing to her headaches as well she will get back on 30 mg daily.    No additional concern or issue      Review of Systems   10 point review of systems positive as outlined above otherwise negative      Objective    Vitals -  Patient Reported  Systolic (Patient Reported): (!) 173  Diastolic (Patient Reported): (!) 106  Blood pressure taken with manual cuff (will exclude from quality measure): Yes  Pulse (Patient Reported): 74        Physical Exam   General appearance no acute distress    Vital signs as outlined    Lungs nonlabored breathing no wheezing    Heart: No palpitations or rapid heart rate.    Skin without rash no rash in through the scalp or head or face.    No temporal artery tenderness.  No focal weakness or numbness.    Previous labs as outlined    Patient will come in for lab only free T4 and a TSH.  We will also check blood pressure rechecked at that time            Video-Visit Details    Type of service:  Video Visit    Video End Time: 11:27 AM    Originating Location (pt. Location): Home    Distant Location (provider location):  [facility]     Platform used for Video Visit: Pumant

## 2022-01-07 ENCOUNTER — ALLIED HEALTH/NURSE VISIT (OUTPATIENT)
Dept: FAMILY MEDICINE | Facility: CLINIC | Age: 67
End: 2022-01-07
Payer: COMMERCIAL

## 2022-01-07 ENCOUNTER — LAB (OUTPATIENT)
Dept: LAB | Facility: CLINIC | Age: 67
End: 2022-01-07
Payer: COMMERCIAL

## 2022-01-07 VITALS — SYSTOLIC BLOOD PRESSURE: 150 MMHG | DIASTOLIC BLOOD PRESSURE: 90 MMHG

## 2022-01-07 DIAGNOSIS — R79.89 LOW TSH LEVEL: ICD-10-CM

## 2022-01-07 DIAGNOSIS — I10 ESSENTIAL HYPERTENSION: Primary | ICD-10-CM

## 2022-01-07 LAB
T4 FREE SERPL-MCNC: 1.19 NG/DL (ref 0.7–1.8)
TSH SERPL DL<=0.005 MIU/L-ACNC: 0.25 UIU/ML (ref 0.3–5)

## 2022-01-07 PROCEDURE — 99207 PR NO CHARGE NURSE ONLY: CPT

## 2022-01-07 PROCEDURE — 36415 COLL VENOUS BLD VENIPUNCTURE: CPT

## 2022-01-07 PROCEDURE — 84443 ASSAY THYROID STIM HORMONE: CPT

## 2022-01-07 PROCEDURE — 84439 ASSAY OF FREE THYROXINE: CPT

## 2022-01-24 ENCOUNTER — TELEPHONE (OUTPATIENT)
Dept: FAMILY MEDICINE | Facility: CLINIC | Age: 67
End: 2022-01-24
Payer: COMMERCIAL

## 2022-01-24 NOTE — TELEPHONE ENCOUNTER
Called pt and left VM to call back to clinic.  Ok to relay results when pt calls back and schedule appt. Also called daughter but also not able to answer phone.  Cell phone for daughter is incorrect - [employer] number.   1st attempt. Thanks            ----- Message from Hector Banda MD sent at 1/24/2022 12:51 PM CST -----  Please contact patient.  Let her know that the thyroid level is improving.No additional medications at this time.Recheck with follow-up visit in 3 to 4 months

## 2022-01-24 NOTE — LETTER
2022      Ma Oma  [address]  [place] [address]        Dear ,    We are writing to inform you of your test results.    The thyroid level is improving. No additional medications at this time.    Recheck with follow-up visit in 3 to 4 months    Resulted Orders   TSH   Result Value Ref Range    TSH 0.25 (L) 0.30 - 5.00 uIU/mL   T4, free   Result Value Ref Range    Free T4 1.19 0.70 - 1.80 ng/dL      Comment:      Performance of the Free T4 test has not been established with  specimens (<= 2 months of age).         If you have any questions or concerns, please call the clinic at the number listed above.       Sincerely,      Dr. Banda

## 2022-02-01 NOTE — PROGRESS NOTES
I met with Sivakumar Ernandez at the request of Hector Banda   to recheck her blood pressure.  Blood pressure medications on the med list were reviewed with patient.    Patient has taken all medications as per usual regimen: Yes  Patient reports tolerating them without any issues or concerns: Yes    Vitals:    01/07/22 1127 01/07/22 1141   BP: (!) 172/96 (!) 150/90       After 5 minutes, the patient's blood pressure remained greater than or equal to 140/90.    Is the patient currently having any chest pain? No  Does the patient currently have a headache? No  Does the patient currently have any vision changes? No  Does the patient currently have any nausea? No  Does the patient currently have any abdominal pain? No    The previous encounter was reviewed.  The patient was discharged and the note will be sent to the provider for final review.

## 2022-02-16 ENCOUNTER — MEDICAL CORRESPONDENCE (OUTPATIENT)
Dept: HEALTH INFORMATION MANAGEMENT | Facility: CLINIC | Age: 67
End: 2022-02-16
Payer: COMMERCIAL

## 2022-02-16 ENCOUNTER — TELEPHONE (OUTPATIENT)
Dept: FAMILY MEDICINE | Facility: CLINIC | Age: 67
End: 2022-02-16
Payer: COMMERCIAL

## 2022-02-16 NOTE — LETTER
February 18, 2022      Sivakumar Ernandez  [address]  [place] [address]        To Whom It May Concern,      We have been trying to you contact you and your daughter in regards to a recent request for a PCA. We tried calling your daughter on three separate occasions, but was unable to reach her. We were not aware of any PCA request in reviewing the last couple of [facility] chart notes.    Are you wanting a PCA or wanting supplies from [facility] medical like incontinence supplies? We need more information for the following request.     If you have any questions regarding the following message, please call the [facility] Clinic number above.          Sincerely,        Hector Banda MD

## 2022-02-16 NOTE — TELEPHONE ENCOUNTER
"RN attempt to call pt's daughter, Amber Ernandez (C2C on file) regarding initial message below. No answer. Voicemail indicates \"We're sorry, your call cannot be completed at this time. Please try again another time.\" Unable to leave voicemail.    Will attempt to call pt's daughter another time to gain more information as requested from Dr. Banda below.    BRADLEY SaraviaN, RN   [facility]    "

## 2022-02-16 NOTE — TELEPHONE ENCOUNTER
I am not aware of any PCA request.  I reviewed the chart notes for the last couple months.    And I am not sure why [facility] would be involved in the PCA request.    Please get more information.  Are they actually wanting a PCA, that usually comes through the county and a [facility] nurse goes out and assesses the patient to see if she needs a PCA.    Or do they want something from [facility], like incontinence supplies etc.    I need to know what the diagnosis is for any of these requests as well, why does she need a PCA, why does she need medical supplies etc.

## 2022-02-16 NOTE — TELEPHONE ENCOUNTER
Reason for Call:  Other     Detailed comments: daughter calling to check status of the APA for a PCA request sent to clinic     Phone Number Patient can be reached at: Other phone number:  [phone]    Best Time: anytime    Can we leave a detailed message on this number? YES    Call taken on 2/16/2022 at 12:03 PM by Barbie Chicas

## 2022-02-17 NOTE — TELEPHONE ENCOUNTER
RN made 2nd attempt to contact patient's daughter using a [employer] , but no answer. Phone just rings. Will try patient's daughter later.    Nicolle Fritz RN  [facility]

## 2022-02-18 NOTE — TELEPHONE ENCOUNTER
RN attempt #3 in trying to call pt with the help of a [facility] . No answer. Phone just keeps ringing.    Third attempt in trying to reach pt's daughter, Amber Ernandez (C2C on file) regarding request for PCA.    Letter will be sent out.    If pt daughter calls back, please transfer to RN line to obtain more information regarding their request for the a PCA (as mentioned in Dr. Banda's message below).    Thanks,    Closing encounter.    BRADLEY SaraviaN, RN   [facility]

## 2022-02-28 ENCOUNTER — APPOINTMENT (OUTPATIENT)
Dept: INTERPRETER SERVICES | Facility: CLINIC | Age: 67
End: 2022-02-28
Payer: COMMERCIAL

## 2022-02-28 ENCOUNTER — TELEPHONE (OUTPATIENT)
Dept: FAMILY MEDICINE | Facility: CLINIC | Age: 67
End: 2022-02-28
Payer: COMMERCIAL

## 2022-02-28 NOTE — TELEPHONE ENCOUNTER
"Amber, daughter of patient Sivakumar Ernandez calling and she is calling because she got a call from the clinic and said that the voicemail said just \"to call them back\" Daughter thinks it is because of the medical supplies.  Mom used to see Dr. Banda apparently, but now sees Dr. Rendon.  So this message needs to go to Dr. Rendon.  Daughter says she had her diapers, but needs the \"gloves\" Mom needs size small latex gloves.  This is all that she needs. She had never had them before, but needs them. The  apparently requested the gloves. The  is Bianka Arnold from [facility] and her phone is [phone] and daughter Amber can be reached at:    [phone] and you can leave a detailed voice mail if needed. Thank you.   "

## 2022-03-01 ENCOUNTER — APPOINTMENT (OUTPATIENT)
Dept: INTERPRETER SERVICES | Facility: CLINIC | Age: 67
End: 2022-03-01
Payer: COMMERCIAL

## 2022-03-01 DIAGNOSIS — R32 URINARY INCONTINENCE, UNSPECIFIED TYPE: Primary | ICD-10-CM

## 2022-03-01 NOTE — TELEPHONE ENCOUNTER
RN spoke to Evangelista from [facility]. Per Evangelista, once DME order is received, they will reach out to pt to help schedule pick-up of items.    RN printed DME order and faxed order to [facility] fax # [phone]    Closing encounter.    BRADLEY SaraviaN, RN   [facility]

## 2022-03-01 NOTE — TELEPHONE ENCOUNTER
"RN called patient's daughter, Amber, (C2C on file) regarding her request of gloves.    According to patient's daughter, patient wears diapers and needs to wear gloves to help herself to clean.     RN did chart review and noticed patient's daughter has called on 2/16/2022 to check status of the APA for a PCA request.     Patient's daughter stated that patient did not need PCA at all. Patient just needs \"size small latex gloves and send the request to [facility] medical equipment\".    RN will route this encounter to Dr. Rendon per patient's daughter's request to review and advise.        Elizabeth Green RN  [facility]           "

## 2022-03-01 NOTE — PROGRESS NOTES
DME (Durable Medical Equipment) Orders and Documentation  Orders Placed This Encounter   Procedures     Miscellaneous DME Order      The patient was assessed and it was determined the patient is in need of the following listed DME Supplies/Equipment. Please complete supporting documentation below to demonstrate medical necessity.      DME All Other Item(s) Documentation    List reason for need and supporting documentation for medical necessity below for each DME item.     1. Has incontinence and wears protective clothing.  Needs gloves for changes.

## 2022-04-25 ENCOUNTER — PATIENT OUTREACH (OUTPATIENT)
Dept: GERIATRIC MEDICINE | Facility: CLINIC | Age: 67
End: 2022-04-25
Payer: COMMERCIAL

## 2022-04-25 NOTE — PROGRESS NOTES
Called adult daughter Amber to schedule annual HRA home visit. Left a message requesting a return call to schedule HRA.     Alexandra Ortiz ARNULFO  [facility]  [phone]

## 2022-04-26 NOTE — PROGRESS NOTES
4/26/22    Called adult daughter Amber Ernandez to schedule annual HRA home visit. HRA has been scheduled for Wednesday, April 27 at 5 PM.     DHRUV Hidalgo  [facility]  [phone]

## 2022-04-27 ENCOUNTER — PATIENT OUTREACH (OUTPATIENT)
Dept: GERIATRIC MEDICINE | Facility: CLINIC | Age: 67
End: 2022-04-27
Payer: COMMERCIAL

## 2022-04-27 ASSESSMENT — PATIENT HEALTH QUESTIONNAIRE - PHQ9: SUM OF ALL RESPONSES TO PHQ QUESTIONS 1-9: 9

## 2022-05-05 ASSESSMENT — ACTIVITIES OF DAILY LIVING (ADL)
DEPENDENT_IADLS:: CLEANING;COOKING;LAUNDRY;SHOPPING;MEAL PREPARATION;MEDICATION MANAGEMENT;MONEY MANAGEMENT;TRANSPORTATION;INCONTINENCE

## 2022-05-05 NOTE — PROGRESS NOTES
[facility] CCDB Assessment   (for members on other waivers)    Home visit for Health Risk Assessment with Sivakumar Ernandez completed on April 27, 2022    Assessment completed over the phone due to COVID-19.     Type of residence:: Private home - stairs  Current living arrangement:: I live alone     Assessment completed with:: Patient, Children    Current Care Plan  Member is a recipient of the CADI Waiver program.  Member currently receiving the following home care services:     Member currently receiving the following community resources: Day Care, PCA, Other (see comment) ([facility] [facility] [employer])      Medication Review  Medication reconciliation completed in Epic: If no, please explain Assessment completed over the phone due to COVID-19.   Medication set-up & administration: Family/informal caregiver sets up weekly.  Family caregiver administers medications.  Medication Risk Assessment Medication (1 or more, place referral to MTM): N/A: No risk factors identified  MTM Referral Placed: No: No risk factors idenified    Mental/Behavioral Health   Depression Screening:   PHQ-2 Total Score (Adult) - Positive if 3 or more points; Administer PHQ-9 if positive: (!) 4  PHQ-9 Total Score: 9    Mental health DX:: Yes (Depression)   Mental health DX how managed:: Medication    Falls Assessment:   Fallen 2 or more times in the past year?: No   Any fall with injury in the past year?: No    ADL/IADL Dependencies:   Dependent ADLs:: Ambulation-cane, Dressing, Grooming, Bathing, Positioning, Transfers, Wheelchair-with assist, Toileting, Incontinence  Dependent IADLs:: Cleaning, Cooking, Laundry, Shopping, Meal Preparation, Medication Management, Money Management, Transportation, Incontinence    [facility] Health Plan sponsored benefits: Shared information re: Silver Sneakers/gym memberships, ASA, Calcium +D.    PCA Assessment completed at visit: No      Care Plan & Recommendations: Member will continue with services authorized through  the CADI waiver.     CADI Care Plan/ISP requested from waiver .     Follow-Up Plan: Member informed of future contact, plan to f/u with member with a 6 month telephone assessment.  Contact information shared with member and family, encouraged member to call with any questions or concerns at any time.    [facility] care continuum providers: Please see Snapshot and Care Management Flowsheets for Specific details of care plan.    This CC note routed to PCP.     DHRUV Hidalgo  [facility]  [phone]

## 2022-05-19 ENCOUNTER — PATIENT OUTREACH (OUTPATIENT)
Dept: GERIATRIC MEDICINE | Facility: CLINIC | Age: 67
End: 2022-05-19
Payer: COMMERCIAL

## 2022-05-19 NOTE — PROGRESS NOTES
[facility] Care Coordination Contact    Received after visit chart from care coordinator.  Completed following tasks: Mailed copy of care plan to client, Updated services in Database, Mailed copy of POC signature sheet for member to sign and return in SASE , Mailed Consent to Communicate form  and Mailed [facility] Safe Medication Disposal      Jimmy Polanco  [facility]  Case Management Specialist  [phone]

## 2022-05-19 NOTE — LETTER
May 19, 2022    MADDY ANDRE  [address]  [place] [address]        Dear Ma:    At [facility], we are dedicated to improving your health and well-being. Enclosed is the Comprehensive Care Plan that we developed with you on 4/27/22. Please review the Care Plan carefully.    As a reminder, some of the things we discussed at your visit include:    Your physical and mental health    Ways to reduce falls    Health care needs you may have    Don t forget to contact your care coordinator if you:    Have been hospitalized or plan to be hospitalized     Have had a fall     Have experienced a change in physical health    Are experiencing emotional problems     If you do not agree with your Care Plan, have questions about it, or have experienced a change in your needs, please call me at [phone]. If you are hearing impaired, please call the [place] Relay at 155 or [phone] (rguzbo-mf-qgbcxl relay service).    Sincerely,        DHRUV Hidalgo  [phone]  [email or web address]      [facility]+[id number] IA ([id number])     [id number] (11/18)

## 2022-08-19 ENCOUNTER — PATIENT OUTREACH (OUTPATIENT)
Dept: GERIATRIC MEDICINE | Facility: CLINIC | Age: 67
End: 2022-08-19

## 2022-08-19 NOTE — PROGRESS NOTES
CC updated program tasks and targets for [employer] launch.    DHRUV Hidalgo  [facility]  [phone]

## 2022-10-04 ENCOUNTER — TELEPHONE (OUTPATIENT)
Dept: FAMILY MEDICINE | Facility: CLINIC | Age: 67
End: 2022-10-04

## 2022-10-04 NOTE — TELEPHONE ENCOUNTER
General Call      Reason for Call:  [employer] called and would like the updated ICD code for this pt fax to them, please fax it to [phone].    What are your questions or concerns:  ICD code    Date of last appointment with provider: 12/09/2021    Could we send this information to you in [employer] or would you prefer to receive a phone call?:   No preference   Okay to leave a detailed message?: Yes at Home number on file [phone] (home)

## 2022-11-29 ENCOUNTER — PATIENT OUTREACH (OUTPATIENT)
Dept: GERIATRIC MEDICINE | Facility: CLINIC | Age: 67
End: 2022-11-29

## 2022-12-25 ENCOUNTER — HEALTH MAINTENANCE LETTER (OUTPATIENT)
Age: 67
End: 2022-12-25

## 2023-02-21 ENCOUNTER — PATIENT OUTREACH (OUTPATIENT)
Dept: GERIATRIC MEDICINE | Facility: CLINIC | Age: 68
End: 2023-02-21
Payer: COMMERCIAL

## 2023-02-21 NOTE — PROGRESS NOTES
Encounter opened due to Regulatory [employer] Update to open FVP Program.    Jimmy Polanco  [facility]  Case Management Specialist  [phone]

## 2023-02-21 NOTE — PROGRESS NOTES
Encounter opened due to Regulatory [employer] Update to close FVP Program.    Jimmy Polanco  [facility]  Case Management Specialist  [phone]

## 2023-03-23 ENCOUNTER — PATIENT OUTREACH (OUTPATIENT)
Dept: GERIATRIC MEDICINE | Facility: CLINIC | Age: 68
End: 2023-03-23
Payer: COMMERCIAL

## 2023-03-24 NOTE — PROGRESS NOTES
Called adult daughter Amber to schedule annual HRA home visit. HRA has been scheduled for Tuesday, April 4th at 11 AM.. Amber reported that she is going out of town and will not return until April 2nd.     Alexandra Ortiz ARNULFO  [facility]  [phone]

## 2023-04-04 ENCOUNTER — PATIENT OUTREACH (OUTPATIENT)
Dept: GERIATRIC MEDICINE | Facility: CLINIC | Age: 68
End: 2023-04-04
Payer: COMMERCIAL

## 2023-04-18 NOTE — PROGRESS NOTES
[facility] CCDB Assessment   (for members on other waivers)    Home visit for Health Risk Assessment with Sivakumar Ernandez completed on April 4, 2023    Type of residence:: Private home - stairs  Current living arrangement:: I live in a private home with family     Assessment completed with:: Patient, Children    Current Care Plan  Member is a recipient of the CADI Waiver program.  Member currently receiving the following home care services:     Member currently receiving the following community resources: Day Care, PCA, Other (see comment) ([facility] [facility] [employer])      Medication Review  Medication reconciliation completed in Epic: Yes  Medication set-up & administration: Family/informal caregiver sets up weekly.  Family caregiver administers medications.  Medication Risk Assessment Medication (1 or more, place referral to MTM): N/A: No risk factors identified  MTM Referral Placed: No: No risk factors idenified    Mental/Behavioral Health   Depression Screening:   PHQ-2 Total Score (Adult) - Positive if 3 or more points; Administer PHQ-9 if positive: 2       Mental health DX:: Yes   Mental health DX how managed:: Medication    Falls Assessment:   Fallen 2 or more times in the past year?: Yes   Any fall with injury in the past year?: No    ADL/IADL Dependencies:   Dependent ADLs:: Ambulation-cane, Dressing, Grooming, Bathing, Positioning, Transfers, Wheelchair-with assist, Toileting, Incontinence  Dependent IADLs:: Cleaning, Cooking, Laundry, Shopping, Meal Preparation, Medication Management, Money Management, Transportation, Incontinence    [facility] Health Plan sponsored benefits: Shared information re: Silver Sneakers/gym memberships, ASA, Calcium +D.    PCA Assessment completed at visit: No      Care Plan & Recommendations: Recommends that member follow-up with PCP to complete annual wellness exam. Member will continue with services authorized through CADI Waiver. Member will contact [facility] with any questions,  concerns, or changes in need.     CADI Care Plan/ISP requested from waiver .     Follow-Up Plan: Member informed of future contact, plan to f/u with member with a 6 month telephone assessment.  Contact information shared with member and family, encouraged member to call with any questions or concerns at any time.    [facility] care continuum providers: Please see Snapshot and Care Management Flowsheets for Specific details of care plan.    This CC note routed to PCP.     DHRUV Hidalgo  [facility]  [phone]

## 2023-04-28 NOTE — PROGRESS NOTES
[facility] Six-Month Telephone Assessment    6 month telephone assessment completed on 11/29/22.    ER visits: No  Hospitalizations: No  TCU stays: No  Significant health status changes: None reported.   Falls/Injuries: Yes: 5-6 falls with no injuries.   ADL/IADL changes: No  Changes in services: No    Caregiver Assessment follow up:  N/A    Goals: See POC in chart for goal progress documentation.     Will see member in 6 months for an annual health risk assessment.   Encouraged member to call CC with any questions or concerns in the meantime.       DHRUV Hidalgo  [facility]  [phone]

## 2023-05-02 ENCOUNTER — PATIENT OUTREACH (OUTPATIENT)
Dept: GERIATRIC MEDICINE | Facility: CLINIC | Age: 68
End: 2023-05-02
Payer: COMMERCIAL

## 2023-05-02 NOTE — PROGRESS NOTES
[facility] Care Coordination Contact    Received after visit chart from care coordinator.  Completed following tasks: Mailed copy of care plan to client, Updated services in Database and Mailed Safe Medication Disposal      Jimmy Polanco  [facility]  Case Management Specialist  [phone]

## 2023-05-02 NOTE — LETTER
May 2, 2023    MADDY ANDRE  [address]  [place] [address]        Dear Ma:    At [facility], we re dedicated to improving your health and wellness. Enclosed is the Care Plan developed with you on 4/4/23. Please review the Care Plan carefully.    As a reminder, during your visit we talked about:  Ways to manage your physical and mental health  Using health care to maintain and improve your health   Your preventive care needs     Remember to contact your care coordinator if you:  Are hospitalized, or plan to be hospitalized   Have a fall    Have a change in your physical or mental health  Need help finding support or services    If you have questions, or don t agree with your Care Plan, call me at [phone]. You can also call me if your needs change. TTY users, call the [place] Relay at (133) or [phone] (yfqjge-pj-grsmbr relay service).    Sincerely,        DHRUV Hidalgo  [phone]  [email or web address]    [id number] accepted    [id number] (07/2022)

## 2023-05-17 ENCOUNTER — MEDICAL CORRESPONDENCE (OUTPATIENT)
Dept: HEALTH INFORMATION MANAGEMENT | Facility: CLINIC | Age: 68
End: 2023-05-17
Payer: COMMERCIAL

## 2023-05-17 PROBLEM — R32 INCONTINENCE: Status: ACTIVE | Noted: 2023-05-17

## 2023-11-08 ENCOUNTER — PATIENT OUTREACH (OUTPATIENT)
Dept: GERIATRIC MEDICINE | Facility: CLINIC | Age: 68
End: 2023-11-08
Payer: COMMERCIAL

## 2023-11-08 NOTE — PROGRESS NOTES
[facility] Six-Month Telephone Assessment    6 month telephone assessment completed on 11/8/23.    ER visits: No  Hospitalizations: No  TCU stays: No  Significant health status changes: None reported.   Falls/Injuries: No  ADL/IADL changes: No  Changes in services: No    Caregiver Assessment follow up:  N/A    Goals: See POC in chart for goal progress documentation.      Will see member in 6 months for an annual health risk assessment.   Encouraged member to call CC with any questions or concerns in the meantime.     DHRUV Hidalgo  [facility]  [phone]

## 2023-11-26 ENCOUNTER — HEALTH MAINTENANCE LETTER (OUTPATIENT)
Age: 68
End: 2023-11-26

## 2024-01-02 ENCOUNTER — TELEPHONE (OUTPATIENT)
Dept: FAMILY MEDICINE | Facility: CLINIC | Age: 69
End: 2024-01-02

## 2024-01-02 NOTE — TELEPHONE ENCOUNTER
Forms/Letter Request    Type of form/letter:  ICD-10 Diagnosis Verification Form    Have you been seen for this request: No    Do we have the form/letter: Yes    Who is the form from? CADI , Elza Mendoza    Where did/will the form come from? form was mailed in    When is form/letter needed by: 01/09/2024    How would you like the form/letter returned: Fax : [phone]    Patient Notified form requests are processed in 3-5 business days:No    Could we send this information to you in [facility] or would you prefer to receive a phone call?:   No preference   Okay to leave a detailed message?: Yes at Other phone number:  [phone]

## 2024-01-04 NOTE — TELEPHONE ENCOUNTER
We have not seen patient in over a year.  I fill out the form to say that, but unfortunately, cannot further fill it out.

## 2024-02-03 ENCOUNTER — MEDICAL CORRESPONDENCE (OUTPATIENT)
Dept: HEALTH INFORMATION MANAGEMENT | Facility: CLINIC | Age: 69
End: 2024-02-03
Payer: COMMERCIAL

## 2024-02-04 ENCOUNTER — HEALTH MAINTENANCE LETTER (OUTPATIENT)
Age: 69
End: 2024-02-04

## 2024-02-15 ENCOUNTER — PATIENT OUTREACH (OUTPATIENT)
Dept: GERIATRIC MEDICINE | Facility: CLINIC | Age: 69
End: 2024-02-15
Payer: COMMERCIAL

## 2024-02-29 ENCOUNTER — ORDERS ONLY (AUTO-RELEASED) (OUTPATIENT)
Dept: FAMILY MEDICINE | Facility: CLINIC | Age: 69
End: 2024-02-29

## 2024-02-29 ENCOUNTER — OFFICE VISIT (OUTPATIENT)
Dept: FAMILY MEDICINE | Facility: CLINIC | Age: 69
End: 2024-02-29
Payer: COMMERCIAL

## 2024-02-29 VITALS
TEMPERATURE: 97.5 F | HEIGHT: 58 IN | BODY MASS INDEX: 30.23 KG/M2 | HEART RATE: 64 BPM | RESPIRATION RATE: 20 BRPM | WEIGHT: 144 LBS | OXYGEN SATURATION: 98 % | SYSTOLIC BLOOD PRESSURE: 194 MMHG | DIASTOLIC BLOOD PRESSURE: 104 MMHG

## 2024-02-29 DIAGNOSIS — Z12.31 VISIT FOR SCREENING MAMMOGRAM: ICD-10-CM

## 2024-02-29 DIAGNOSIS — J30.2 SEASONAL ALLERGIES: ICD-10-CM

## 2024-02-29 DIAGNOSIS — K59.00 CONSTIPATION, UNSPECIFIED CONSTIPATION TYPE: ICD-10-CM

## 2024-02-29 DIAGNOSIS — Z12.11 SCREEN FOR COLON CANCER: ICD-10-CM

## 2024-02-29 DIAGNOSIS — M1A.0790 CHRONIC GOUT OF ANKLE, UNSPECIFIED CAUSE, UNSPECIFIED LATERALITY: ICD-10-CM

## 2024-02-29 DIAGNOSIS — I10 ESSENTIAL HYPERTENSION: ICD-10-CM

## 2024-02-29 DIAGNOSIS — E55.9 VITAMIN D DEFICIENCY: ICD-10-CM

## 2024-02-29 DIAGNOSIS — R26.89 IMPAIRED GAIT AND MOBILITY: ICD-10-CM

## 2024-02-29 DIAGNOSIS — Z00.00 ROUTINE GENERAL MEDICAL EXAMINATION AT A HEALTH CARE FACILITY: Primary | ICD-10-CM

## 2024-02-29 LAB
ERYTHROCYTE [DISTWIDTH] IN BLOOD BY AUTOMATED COUNT: 12 % (ref 10–15)
HBA1C MFR BLD: 6.1 % (ref 0–5.6)
HCT VFR BLD AUTO: 42.7 % (ref 35–47)
HGB BLD-MCNC: 13.7 G/DL (ref 11.7–15.7)
MCH RBC QN AUTO: 27.9 PG (ref 26.5–33)
MCHC RBC AUTO-ENTMCNC: 32.1 G/DL (ref 31.5–36.5)
MCV RBC AUTO: 87 FL (ref 78–100)
PLATELET # BLD AUTO: 211 10E3/UL (ref 150–450)
RBC # BLD AUTO: 4.91 10E6/UL (ref 3.8–5.2)
WBC # BLD AUTO: 6.3 10E3/UL (ref 4–11)

## 2024-02-29 PROCEDURE — 82248 BILIRUBIN DIRECT: CPT | Performed by: FAMILY MEDICINE

## 2024-02-29 PROCEDURE — 36415 COLL VENOUS BLD VENIPUNCTURE: CPT | Performed by: FAMILY MEDICINE

## 2024-02-29 PROCEDURE — 90678 RSV VACC PREF BIVALENT IM: CPT | Performed by: FAMILY MEDICINE

## 2024-02-29 PROCEDURE — 99214 OFFICE O/P EST MOD 30 MIN: CPT | Mod: 25 | Performed by: FAMILY MEDICINE

## 2024-02-29 PROCEDURE — 90480 ADMN SARSCOV2 VAC 1/ONLY CMP: CPT | Performed by: FAMILY MEDICINE

## 2024-02-29 PROCEDURE — 99397 PER PM REEVAL EST PAT 65+ YR: CPT | Mod: 25 | Performed by: FAMILY MEDICINE

## 2024-02-29 PROCEDURE — 84443 ASSAY THYROID STIM HORMONE: CPT | Performed by: FAMILY MEDICINE

## 2024-02-29 PROCEDURE — 90750 HZV VACC RECOMBINANT IM: CPT | Performed by: FAMILY MEDICINE

## 2024-02-29 PROCEDURE — 85027 COMPLETE CBC AUTOMATED: CPT | Performed by: FAMILY MEDICINE

## 2024-02-29 PROCEDURE — 80053 COMPREHEN METABOLIC PANEL: CPT | Performed by: FAMILY MEDICINE

## 2024-02-29 PROCEDURE — 90472 IMMUNIZATION ADMIN EACH ADD: CPT | Performed by: FAMILY MEDICINE

## 2024-02-29 PROCEDURE — 91320 SARSCV2 VAC 30MCG TRS-SUC IM: CPT | Performed by: FAMILY MEDICINE

## 2024-02-29 PROCEDURE — 90677 PCV20 VACCINE IM: CPT | Performed by: FAMILY MEDICINE

## 2024-02-29 PROCEDURE — 80061 LIPID PANEL: CPT | Performed by: FAMILY MEDICINE

## 2024-02-29 PROCEDURE — 82306 VITAMIN D 25 HYDROXY: CPT | Performed by: FAMILY MEDICINE

## 2024-02-29 PROCEDURE — 96372 THER/PROPH/DIAG INJ SC/IM: CPT | Performed by: FAMILY MEDICINE

## 2024-02-29 PROCEDURE — 84550 ASSAY OF BLOOD/URIC ACID: CPT | Performed by: FAMILY MEDICINE

## 2024-02-29 PROCEDURE — 90471 IMMUNIZATION ADMIN: CPT | Performed by: FAMILY MEDICINE

## 2024-02-29 PROCEDURE — 83036 HEMOGLOBIN GLYCOSYLATED A1C: CPT | Performed by: FAMILY MEDICINE

## 2024-02-29 RX ORDER — POLYETHYLENE GLYCOL 3350 17 G/17G
1 POWDER, FOR SOLUTION ORAL DAILY
Qty: 578 G | Refills: 1 | Status: SHIPPED | OUTPATIENT
Start: 2024-02-29

## 2024-02-29 RX ORDER — ACETAMINOPHEN 500 MG
1000 TABLET ORAL EVERY 8 HOURS PRN
Qty: 100 TABLET | Refills: 2 | Status: SHIPPED | OUTPATIENT
Start: 2024-02-29

## 2024-02-29 RX ORDER — LOSARTAN POTASSIUM 100 MG/1
100 TABLET ORAL DAILY
Qty: 90 TABLET | Refills: 1 | Status: SHIPPED | OUTPATIENT
Start: 2024-02-29

## 2024-02-29 RX ORDER — CETIRIZINE HYDROCHLORIDE 10 MG/1
10 TABLET ORAL DAILY
Qty: 30 TABLET | Refills: 5 | Status: SHIPPED | OUTPATIENT
Start: 2024-02-29

## 2024-02-29 SDOH — HEALTH STABILITY: PHYSICAL HEALTH: ON AVERAGE, HOW MANY DAYS PER WEEK DO YOU ENGAGE IN MODERATE TO STRENUOUS EXERCISE (LIKE A BRISK WALK)?: 0 DAYS

## 2024-02-29 ASSESSMENT — SOCIAL DETERMINANTS OF HEALTH (SDOH): HOW OFTEN DO YOU GET TOGETHER WITH FRIENDS OR RELATIVES?: NEVER

## 2024-02-29 ASSESSMENT — PATIENT HEALTH QUESTIONNAIRE - PHQ9
SUM OF ALL RESPONSES TO PHQ QUESTIONS 1-9: 21
SUM OF ALL RESPONSES TO PHQ QUESTIONS 1-9: 21
10. IF YOU CHECKED OFF ANY PROBLEMS, HOW DIFFICULT HAVE THESE PROBLEMS MADE IT FOR YOU TO DO YOUR WORK, TAKE CARE OF THINGS AT HOME, OR GET ALONG WITH OTHER PEOPLE: VERY DIFFICULT

## 2024-02-29 NOTE — ACP (ADVANCE CARE PLANNING)
Have you ever done Advance Care Planning? (For example, a Health Directive, POLST, or a discussion with a medical provider or your loved ones about your wishes): No, advance care planning information given to patient to review.  Patient declined advance care planning discussion at this time.

## 2024-02-29 NOTE — PROGRESS NOTES
"Preventive Care Visit  [facility]  Néstor Rendon MD, Family Medicine  Feb 29, 2024    Assessment & Plan     Routine general medical examination at a health care facility  Seen today after long absence.  Will recommend restarting meds.    Essential hypertension  Quite uncontrolled.  Sequentially restart meds.  Will start with losartan at 100 mg.  - losartan (COZAAR) 100 MG tablet  Dispense: 90 tablet; Refill: 1  - Vitamin D deficiency screening  - CBC with platelets  - Lipid panel reflex to direct LDL Non-fasting  - Hemoglobin A1c    Vitamin D deficiency  Quite low vitamin D.  Will recommend high-dose replacement weekly for 3 months.  - vitamin D2 (ERGOCALCIFEROL) 19410 units (1250 mcg) capsule  Dispense: 12 capsule; Refill: 0    Chronic gout of ankle, unspecified cause, unspecified laterality  Check uric acid.    Seasonal allergies  Renew antihistamines.  - acetaminophen (TYLENOL) 500 MG tablet  Dispense: 100 tablet; Refill: 2  - cetirizine (ZYRTEC) 10 MG tablet  Dispense: 30 tablet; Refill: 5    Constipation, unspecified constipation type  Recommended colonoscopy.  Patient declined.  Check labs.  - TSH with free T4 reflex  - Basic metabolic panel  (Ca, Cl, CO2, Creat, Gluc, K, Na, BUN)  - Hepatic panel (Albumin, ALT, AST, Bili, Alk Phos, TP)  - polyethylene glycol (MIRALAX) 17 GM/Dose powder  Dispense: 578 g; Refill: 1    Visit for screening mammogram  Patient declined    Impaired gait and mobility  Complaints of multiple pains throughout body.  - Cane Order for DME - ONLY FOR DME    Screen for colon cancer  Screening methods discussed.  Declined colonoscopy.  Wanted stool based test.  - COLOGUAPRAMOD([employer])      BMI  Estimated body mass index is 29.82 kg/m  as calculated from the following:    Height as of this encounter: 1.48 m (4' 10.27\").    Weight as of this encounter: 65.3 kg (144 lb).     Counseling  Appropriate preventive services were discussed with this patient, including " applicable screening as appropriate for fall prevention, nutrition, physical activity, Tobacco-use cessation, weight loss and cognition.  Checklist reviewing preventive services available has been given to the patient.  Reviewed patient's diet, addressing concerns and/or questions.   Patient is at risk for social isolation and has been provided with information about the benefit of social connection.   The patient was instructed to see the dentist every 6 months.   Discussed possible causes of fatigue. Updated plan of care.  Patient reported difficulty with activities of daily living were addressed today.Patient reported safety concerns were addressed today.The patient was provided with written information regarding signs of hearing loss.   The patient's PHQ-9 score is consistent with severe depression. She was provided with information regarding depression.     Subjective   Ma is a 68 year old, presenting for the following:  Wellness Visit        2/29/2024    10:22 AM   Additional Questions   Roomed by Patel   Accompanied by Daughter        Health Care Directive  Patient does not have a Health Care Directive or Living Will: Discussed advance care planning with patient; however, patient declined at this time.    HPI    68-year-old female who is deaf here with daughter who does sign language interpreting.  Concerns of pains in many areas.  Difficulty moving.  Not taking any medication for a long time.  History of hypertension.  Previous prescriptions for 3 different agents.  Off all meds now and has been for at least a year.    No concerns about breathing.  Does have cough occasionally.  Notes some blurry vision.    Lives alone by herself.  Some support from family.  Goes to senior center many days.      Concerns about constipation.  Not taking any medication for it.  Stools hard and large.        2/29/2024   General Health   How would you rate your overall physical health? (!) POOR   Feel stress (tense, anxious,  or unable to sleep) Rather much   (!) STRESS CONCERN      2/29/2024   Nutrition   Diet: Low salt    Low fat/cholesterol    Vegetarian/vegan    Gluten-free/reduced         2/29/2024   Exercise   Days per week of moderate/strenous exercise 0 days   (!) EXERCISE CONCERN      2/29/2024   Social Factors   Frequency of gathering with friends or relatives Never   Worry food won't last until get money to buy more No   Food not last or not have enough money for food? No   Do you have housing?  Yes   Are you worried about losing your housing? No   Lack of transportation? No   Unable to get utilities (heat,electricity)? No   (!) SOCIAL CONNECTIONS CONCERN      2/29/2024   Fall Risk   Fallen 2 or more times in the past year? No   Trouble with walking or balance? Yes   Gait Speed Test (Document in seconds) 10   Gait Speed Test Interpretation Greater than 5.01 seconds - ABNORMAL          2/29/2024   Activities of Daily Living- Home Safety   Needs help with the following daily activites Telephone use    Transportation    Shopping    Preparing meals    Housework    Bathing    Laundry    Medication administration    Money management    Toileting    Dressing   Safety concerns in the home Throw rugs in the hallway         2/29/2024   Dental   Dentist two times every year? (!) NO         2/29/2024   Hearing Screening   Hearing concerns? (!) I FEEL THAT PEOPLE ARE MUMBLING OR NOT SPEAKING CLEARLY.    (!) I NEED TO ASK PEOPLE TO SPEAK UP OR REPEAT THEMSELVES.         2/29/2024   Driving Risk Screening   Patient/family members have concerns about driving No         2/29/2024   General Alertness/Fatigue Screening   Have you been more tired than usual lately? (!) YES         2/29/2024   Urinary Incontinence Screening   Bothered by leaking urine in past 6 months No          Today's PHQ-9 Score:       2/29/2024    10:07 AM   PHQ-9 SCORE   PHQ-9 Total Score MyChart 21 (Severe depression)   PHQ-9 Total Score 21         2/29/2024   Substance Use    Alcohol more than 3/day or more than 7/wk Not Applicable   Do you have a current opioid prescription? No   How severe/bad is pain from 1 to 10? 7/10   Do you use any other substances recreationally? No     Social History     Tobacco Use    Smoking status: Never     Passive exposure: Never    Smokeless tobacco: Never   Vaping Use    Vaping Use: Never used   Substance Use Topics    Alcohol use: No    Drug use: No          Mammogram Screening - Mammogram every 1-2 years updated in Health Maintenance based on mutual decision making    I strongly recommended mammogram.  She declined.    ASCVD Risk   The 10-year ASCVD risk score (Toby DAVIS, et al., 2019) is: 24.6%    Values used to calculate the score:      Age: 68 years      Sex: Female      Is Non- : No      Diabetic: No      Tobacco smoker: No      Systolic Blood Pressure: 194 mmHg      Is BP treated: Yes      HDL Cholesterol: 42 mg/dL      Total Cholesterol: 172 mg/dL    Reviewed and updated as needed this visit by Provider       Med Hx  Surg Hx             Past Medical History:   Diagnosis Date    Fibrocystic breast      History reviewed. No pertinent surgical history.    OB History    Para Term  AB Living   3 3 3 0 0 0   SAB IAB Ectopic Multiple Live Births   0 0 0 0 0      # Outcome Date GA Lbr Irving/2nd Weight Sex Delivery Anes PTL Lv   3 Term            2 Term            1 Term              BP Readings from Last 3 Encounters:   24 (!) 194/104   22 (!) 150/90   21 (!) 153/96    Wt Readings from Last 3 Encounters:   24 65.3 kg (144 lb)   21 62.1 kg (137 lb)   21 63 kg (139 lb)          Patient Active Problem List   Diagnosis    Chronic major depressive disorder    Vitamin D deficiency    Seasonal allergies    Deaf    Anorexia    Essential hypertension    Chronic gout of ankle, unspecified cause, unspecified laterality    Chronic pain of left knee    Moderate major depression (H)     Incontinence     History reviewed. No pertinent surgical history.    Social History     Tobacco Use    Smoking status: Never     Passive exposure: Never    Smokeless tobacco: Never   Substance Use Topics    Alcohol use: No     Family History   Problem Relation Age of Onset    Breast Cancer No family hx of          Current Outpatient Medications   Medication Sig Dispense Refill    acetaminophen (TYLENOL) 500 MG tablet Take 2 tablets (1,000 mg) by mouth every 8 hours as needed for mild pain 100 tablet 2    blood pressure test kit-medium Kit [BLOOD PRESSURE TEST KIT-MEDIUM KIT]       cetirizine (ZYRTEC) 10 MG tablet Take 1 tablet (10 mg) by mouth daily 30 tablet 5    cholecalciferol 50 MCG (2000 UT) CAPS Take 2,000 Units by mouth daily 90 capsule 3    cyclobenzaprine (FLEXERIL) 5 MG tablet Take 1 tablet (5 mg) by mouth 3 times daily as needed for muscle spasms 30 tablet 1    DULoxetine (CYMBALTA) 30 MG capsule Take 1 capsule (30 mg) by mouth daily 90 capsule 1    food supplemt, lactose-reduced (ENSURE) Liqd [FOOD SUPPLEMT, LACTOSE-REDUCED (ENSURE) LIQD] DRINK 1 BOTTLE BY MOUTH DAILY // IB HNUB HAUS 1 POOM 5,688 mL 5    ibuprofen (ADVIL,MOTRIN) 400 MG tablet [IBUPROFEN (ADVIL,MOTRIN) 400 MG TABLET] 1 po tid as needed for gout pain. 40 tablet 5    losartan (COZAAR) 100 MG tablet Take 1 tablet (100 mg) by mouth daily 90 tablet 1    Multiple Vitamin (MULTI VITAMIN) TABS Take 1 tablet by mouth daily 120 tablet 2    polyethylene glycol (MIRALAX) 17 GM/Dose powder Take 17 g (1 Capful) by mouth daily 578 g 1    vitamin D2 (ERGOCALCIFEROL) 50610 units (1250 mcg) capsule Take 1 capsule (50,000 Units) by mouth once a week for 90 days 12 capsule 0     No Known Allergies  Recent Labs   Lab Test 02/29/24  1155 01/07/22  1146 11/11/21  1310 12/03/18  0933   A1C 6.1*  --   --   --    *  --  103 113   HDL 42*  --  48* 45*   TRIG 112  --  126 142   ALT 21  --   --  20   CR 0.79  --  0.76 0.78   GFRESTIMATED 81  --  81 >60  "  GFRESTBLACK  --   --   --  >60   POTASSIUM 4.5  --  4.2 4.0   TSH 0.52 0.25* 0.16* 0.79      Current providers sharing in care for this patient include:  Patient Care Team:  Néstor Rendon MD as PCP - General (Family Medicine)  Alexandra Ortiz as Lead Care Coordinator (Primary Care - CC)  Néstor Rendon MD as Assigned PCP    The following health maintenance items are reviewed in Epic and correct as of today:  Health Maintenance   Topic Date Due    DEXA  Never done    DEPRESSION ACTION PLAN  Never done    IPV IMMUNIZATION (2 of 3 - Adult catch-up series) 12/02/2009    MAMMO SCREENING  02/19/2018    COLORECTAL CANCER SCREENING  12/10/2021    ANNUAL REVIEW OF HM ORDERS  12/09/2022    INFLUENZA VACCINE (1) 09/01/2023    ZOSTER IMMUNIZATION (2 of 2) 04/25/2024    PHQ-9  08/29/2024    MEDICARE ANNUAL WELLNESS VISIT  02/28/2025    FALL RISK ASSESSMENT  02/28/2025    DTAP/TDAP/TD IMMUNIZATION (3 - Td or Tdap) 01/31/2027    GLUCOSE  02/28/2027    LIPID  02/28/2029    ADVANCE CARE PLANNING  02/28/2029    HEPATITIS C SCREENING  Completed    Pneumococcal Vaccine: 65+ Years  Completed    RSV VACCINE (Pregnancy & 60+)  Completed    COVID-19 Vaccine  Completed    HPV IMMUNIZATION  Aged Out    MENINGITIS IMMUNIZATION  Aged Out    RSV MONOCLONAL ANTIBODY  Aged Out        Objective    Exam  BP (!) 194/104 (BP Location: Left arm, Patient Position: Sitting, Cuff Size: Adult Regular)   Pulse 64   Temp 97.5  F (36.4  C) (Temporal)   Resp 20   Ht 1.48 m (4' 10.27\")   Wt 65.3 kg (144 lb)   LMP  (LMP Unknown)   SpO2 98%   BMI 29.82 kg/m     Estimated body mass index is 29.82 kg/m  as calculated from the following:    Height as of this encounter: 1.48 m (4' 10.27\").    Weight as of this encounter: 65.3 kg (144 lb).    Physical Exam  Gen:   Alert, not distressed  Head:   Normocephalic, without obvious abnormality, atraumatic  Eyes:   PERRL, conjunctiva/corneas clear, EOM's intact  Ears:   Normal tympanic membranes and external ear " canals  Nose:    Mucosa normal, no drainage or sinus tenderness  Throat:    No erythema or exudates  Neck:    No adenopathy no nodules in thyroid, normal ROM  Lungs:    Clear to auscultation bilaterally, respirations unlabored  Chest wall:  No tenderness or deformity  Breasts: No masses.  No skin changes.  Heart:     Regular, normal S1 and S2, no murmur, gallop or rub  Abdomen:  Soft, non-tender, normal bowel sounds, no masses, no organomegaly  Back:    Symmetric, no curvature, ROM normal, no CVA tenderness  Extremities:   Extremities normal, atraumatic, no cyanosis or edema  Skin:     Skin color, texture, turgor normal, no rashes or lesions  Lymph nodes:   Cervical and supraclavicular nodes normal  Neurologic:   CNII-XII intact.   DTRs normal and symmetric.  Symmetric strength and sensation.    No results found for this or any previous visit (from the past 24 hour(s)).   Recent Results (from the past 240 hour(s))   Vitamin D deficiency screening    Collection Time: 02/29/24 11:55 AM   Result Value Ref Range    Vitamin D, Total (25-Hydroxy) 13 (L) 20 - 50 ng/mL   CBC with platelets    Collection Time: 02/29/24 11:55 AM   Result Value Ref Range    WBC Count 6.3 4.0 - 11.0 10e3/uL    RBC Count 4.91 3.80 - 5.20 10e6/uL    Hemoglobin 13.7 11.7 - 15.7 g/dL    Hematocrit 42.7 35.0 - 47.0 %    MCV 87 78 - 100 fL    MCH 27.9 26.5 - 33.0 pg    MCHC 32.1 31.5 - 36.5 g/dL    RDW 12.0 10.0 - 15.0 %    Platelet Count 211 150 - 450 10e3/uL   Lipid panel reflex to direct LDL Non-fasting    Collection Time: 02/29/24 11:55 AM   Result Value Ref Range    Cholesterol 172 <200 mg/dL    Triglycerides 112 <150 mg/dL    Direct Measure HDL 42 (L) >=50 mg/dL    LDL Cholesterol Calculated 108 (H) <=100 mg/dL    Non HDL Cholesterol 130 (H) <130 mg/dL    Patient Fasting > 8hrs? Unknown    Hemoglobin A1c    Collection Time: 02/29/24 11:55 AM   Result Value Ref Range    Hemoglobin A1C 6.1 (H) 0.0 - 5.6 %   TSH with free T4 reflex    Collection  Time: 02/29/24 11:55 AM   Result Value Ref Range    TSH 0.52 0.30 - 4.20 uIU/mL   Basic metabolic panel  (Ca, Cl, CO2, Creat, Gluc, K, Na, BUN)    Collection Time: 02/29/24 11:55 AM   Result Value Ref Range    Sodium 141 135 - 145 mmol/L    Potassium 4.5 3.4 - 5.3 mmol/L    Chloride 102 98 - 107 mmol/L    Carbon Dioxide (CO2) 27 22 - 29 mmol/L    Anion Gap 12 7 - 15 mmol/L    Urea Nitrogen 8.8 8.0 - 23.0 mg/dL    Creatinine 0.79 0.51 - 0.95 mg/dL    GFR Estimate 81 >60 mL/min/1.73m2    Calcium 9.4 8.8 - 10.2 mg/dL    Glucose 88 70 - 99 mg/dL   Hepatic panel (Albumin, ALT, AST, Bili, Alk Phos, TP)    Collection Time: 02/29/24 11:55 AM   Result Value Ref Range    Protein Total 8.1 6.4 - 8.3 g/dL    Albumin 4.3 3.5 - 5.2 g/dL    Bilirubin Total 0.4 <=1.2 mg/dL    Alkaline Phosphatase 113 40 - 150 U/L    AST 22 0 - 45 U/L    ALT 21 0 - 50 U/L    Bilirubin Direct <0.20 0.00 - 0.30 mg/dL            2/29/2024   Mini Cog   Mini-Cog Not Completed (choose reason) Deaf/hard of hearing   Clock Draw Score 0 Abnormal   3 Item Recall 0 objects recalled   Mini Cog Total Score 0       Prior to immunization administration, verified patients identity using patient s name and date of birth. Please see Immunization Activity for additional information.     Screening Questionnaire for Adult Immunization    Are you sick today?   No   Do you have allergies to medications, food, a vaccine component or latex?   No   Have you ever had a serious reaction after receiving a vaccination?   No   Do you have a long-term health problem with heart, lung, kidney, or metabolic disease (e.g., diabetes), asthma, a blood disorder, no spleen, complement component deficiency, a cochlear implant, or a spinal fluid leak?  Are you on long-term aspirin therapy?   No   Do you have cancer, leukemia, HIV/AIDS, or any other immune system problem?   No   Do you have a parent, brother, or sister with an immune system problem?   No   In the past 3 months, have you  taken medications that affect  your immune system, such as prednisone, other steroids, or anticancer drugs; drugs for the treatment of rheumatoid arthritis, Crohn s disease, or psoriasis; or have you had radiation treatments?   No   Have you had a seizure, or a brain or other nervous system problem?   No   During the past year, have you received a transfusion of blood or blood    products, or been given immune (gamma) globulin or antiviral drug?   No   For women: Are you pregnant or is there a chance you could become       pregnant during the next month?   No   Have you received any vaccinations in the past 4 weeks?   No     Immunization questionnaire answers were all negative.      Patient instructed to remain in clinic for 15 minutes afterwards, and to report any adverse reactions.     Screening performed by Patel Hayes on 2/29/2024 at 10:31 AM.     Signed Electronically by: Néstor Rendon MD    Answers submitted by the patient for this visit:  Patient Health Questionnaire (Submitted on 2/29/2024)  If you checked off any problems, how difficult have these problems made it for you to do your work, take care of things at home, or get along with other people?: Very difficult  PHQ9 TOTAL SCORE: 21

## 2024-02-29 NOTE — COMMUNITY RESOURCES LIST (ENGLISH)
02/29/2024   [facility] [employer]  N/A  For questions about this resource list or additional care needs, please contact your primary care clinic or care manager.  Phone: [phone]   Email: N/A   Address: [address] [place] [id number]   Hours: N/A        Exercise and Recreation       Gym or workout facility  1  [facility] [place] [facility] [place] [facility] [place] - Open Gym Distance: 0.19 miles      In-Person   [address] [place] [address]  Language: English  Hours: Mon - Thu 2:00 PM - 9:00 PM , Fri 2:00 PM - 6:00 PM  Fees: Free   Phone: [phone]) [phone] Email: [email or web address]. Website: [email or web address]     2  [facility] [place] [facility] [place] [facility] Distance: 1.29 miles      In-Person   [address] [place] [address]  Language: English  Hours: Mon - Thu 2:00 PM - 9:00 PM , Fri 2:00 PM - 6:00 PM , Sat 1:00 PM - 5:00 PM  Fees: Free, Self Pay   Phone: [phone]) [phone] Email: [email or web address]. Website: [email or web address]          Important Numbers & Websites       Emergency Services   911  [facility]   311  Poison Control   (207) 333-3806  Suicide Prevention Lifeline   (486) 579-1048 (TALK)  Child Abuse Hotline   (395) 183-6648 (4-A-Child)  Sexual Assault Hotline   (299) 977-1381 (HOPE)  National Runaway Safeline   (668) 320-2956 (RUNAWAY)  All-Options Talkline   (483) 219-5635  Substance Abuse Referral   (331) 564-3493 (HELP)

## 2024-02-29 NOTE — PATIENT INSTRUCTIONS
Preventive Care Advice   This is general advice given by our system to help you stay healthy. However, your care team may have specific advice just for you. Please talk to your care team about your preventive care needs.  Nutrition  Eat 5 or more servings of fruits and vegetables each day.  Try wheat bread, brown rice and whole grain pasta (instead of white bread, rice, and pasta).  Get enough calcium and vitamin D. Check the label on foods and aim for 100% of the RDA (recommended daily allowance).  Lifestyle  Exercise at least 150 minutes each week   (30 minutes a day, 5 days a week).  Do muscle strengthening activities 2 days a week. These help control your weight and prevent disease.  No smoking.  Wear sunscreen to prevent skin cancer.  Have a dental exam and cleaning every 6 months.  Yearly exams  See your health care team every year to talk about:  Any changes in your health.  Any medicines your care team has prescribed.  Preventive care, family planning, and ways to prevent chronic diseases.  Shots (vaccines)   HPV shots (up to age 26), if you've never had them before.  Hepatitis B shots (up to age 59), if you've never had them before.  COVID-19 shot: Get this shot when it's due.  Flu shot: Get a flu shot every year.  Tetanus shot: Get a tetanus shot every 10 years.  Pneumococcal, hepatitis A, and RSV shots: Ask your care team if you need these based on your risk.  Shingles shot (for age 50 and up).  General health tests  Diabetes screening:  Starting at age 35, Get screened for diabetes at least every 3 years.  If you are younger than age 35, ask your care team if you should be screened for diabetes.  Cholesterol test: At age 39, start having a cholesterol test every 5 years, or more often if advised.  Bone density scan (DEXA): At age 50, ask your care team if you should have this scan for osteoporosis (brittle bones).  Hepatitis C: Get tested at least once in your life.  STIs (sexually transmitted  infections)  Before age 24: Ask your care team if you should be screened for STIs.  After age 24: Get screened for STIs if you're at risk. You are at risk for STIs (including HIV) if:  You are sexually active with more than one person.  You don't use condoms every time.  You or a partner was diagnosed with a sexually transmitted infection.  If you are at risk for HIV, ask about PrEP medicine to prevent HIV.  Get tested for HIV at least once in your life, whether you are at risk for HIV or not.  Cancer screening tests  Cervical cancer screening: If you have a cervix, begin getting regular cervical cancer screening tests at age 21. Most people who have regular screenings with normal results can stop after age 65. Talk about this with your provider.  Breast cancer scan (mammogram): If you've ever had breasts, begin having regular mammograms starting at age 40. This is a scan to check for breast cancer.  Colon cancer screening: It is important to start screening for colon cancer at age 45.  Have a colonoscopy test every 10 years (or more often if you're at risk) Or, ask your provider about stool tests like a FIT test every year or Cologuard test every 3 years.  To learn more about your testing options, visit: https://www.Move In History/411651.pdf.  For help making a decision, visit: https://bit.ly/sk83979.  Prostate cancer screening test: If you have a prostate and are age 55 to 69, ask your provider if you would benefit from a yearly prostate cancer screening test.  Lung cancer screening: If you are a current or former smoker age 50 to 80, ask your care team if ongoing lung cancer screenings are right for you.  For informational purposes only. Not to replace the advice of your health care provider. Copyright   2023 [employer] [employer]. All rights reserved. Clinically reviewed by the [employer] [facility] Program. Dragon Inside 179046 - REV 01/24.    Learning About Activities of Daily Living  What are activities  of daily living?     Activities of daily living (ADLs) are the basic self-care tasks you do every day. As you age, and if you have health problems, you may find that it's harder to do these things for yourself. That's when you may need some help.  Your doctor uses ADLs to measure how much help you need. Knowing what you can and can't do for yourself is an important first step to getting help. And when you have the help you need, you can stay as independent as possible.  Your doctor will want to know if you are able to do tasks such as:  Take a bath or shower without help.  Go to the bathroom by yourself.  Dress and undress without help.  Shave, comb your hair, and brush teeth on your own.  Get in and out of bed or a chair without help.  Feed yourself without help.  If you are having trouble doing basic self-care tasks, talk with your doctor. You may want to bring a caregiver or family member who can help the doctor understand your needs and abilities.  How will a doctor assess your ADLs?  Asking about ADLs is part of a routine health checkup your doctor will likely do as you age. Your health check might be done in a doctor's office, in your home, or at a hospital. The goal is to find out if you are having any problems that could make your health problems worse or that make it unsafe for you to be on your own.  To measure your ADLs, your doctor will ask how hard it is for you to do routine tasks. He or she may also want to know if you have changed the way you do a task because of a health problem. He or she may watch how you:  Walk back and forth.  Keep your balance while you stand or walk.  Move from sitting to standing or from a bed to a chair.  Button or unbutton a shirt or sweater.  Remove and put on your shoes.  It's normal to feel a little worried or anxious if you find you can't do all the things you used to be able to do. Talking with your doctor about ADLs isn't a test that you either pass or fail. It's just  a way to get more information about your health and safety.  Follow-up care is a key part of your treatment and safety. Be sure to make and go to all appointments, and call your doctor if you are having problems. It's also a good idea to know your test results and keep a list of the medicines you take.  Current as of: February 26, 2023               Content Version: 13.8    9900-4281 [employer].   Care instructions adapted under license by your healthcare professional. If you have questions about a medical condition or this instruction, always ask your healthcare professional. [employer] disclaims any warranty or liability for your use of this information.      Preventing Falls: Care Instructions  Injuries and health problems such as trouble walking or poor eyesight can increase your risk of falling. So can some medicines. But there are things you can do to help prevent falls. You can exercise to get stronger. You can also arrange your home to make it safer.    Talk to your doctor about the medicines you take. Ask if any of them increase the risk of falls and whether they can be changed or stopped.   Try to exercise regularly. It can help improve your strength and balance. This can help lower your risk of falling.     Practice fall safety and prevention.    Wear low-heeled shoes that fit well and give your feet good support. Talk to your doctor if you have foot problems that make this hard.  Carry a cellphone or wear a medical alert device that you can use to call for help.  Use stepladders instead of chairs to reach high objects. Don't climb if you're at risk for falls. Ask for help, if needed.  Wear the correct eyeglasses, if you need them.    Make your home safer.    Remove rugs, cords, clutter, and furniture from walkways.  Keep your house well lit. Use night-lights in hallways and bathrooms.  Install and use sturdy handrails on stairways.  Wear nonskid footwear, even inside. Don't  "walk barefoot or in socks without shoes.    Be safe outside.    Use handrails, curb cuts, and ramps whenever possible.  Keep your hands free by using a shoulder bag or backpack.  Try to walk in well-lit areas. Watch out for uneven ground, changes in pavement, and debris.  Be careful in the winter. Walk on the grass or gravel when sidewalks are slippery. Use de-icer on steps and walkways. Add non-slip devices to shoes.    Put grab bars and nonskid mats in your shower or tub and near the toilet. Try to use a shower chair or bath bench when bathing.   Get into a tub or shower by putting in your weaker leg first. Get out with your strong side first. Have a phone or medical alert device in the bathroom with you.   Where can you learn more?  Go to https://www.[employer]/patiented  Enter G117 in the search box to learn more about \"Preventing Falls: Care Instructions.\"  Current as of: July 18, 2023               Content Version: 13.8    1275-6260 [facility].   Care instructions adapted under license by your healthcare professional. If you have questions about a medical condition or this instruction, always ask your healthcare professional. [facility] disclaims any warranty or liability for your use of this information.      Hearing Loss: Care Instructions  Overview     Hearing loss is a sudden or slow decrease in how well you hear. It can range from slight to profound. Permanent hearing loss can occur with aging. It also can happen when you are exposed long-term to loud noise. Examples include listening to loud music, riding motorcycles, or being around other loud machines.  Hearing loss can affect your work and home life. It can make you feel lonely or depressed. You may feel that you have lost your independence. But hearing aids and other devices can help you hear better and feel connected to others.  Follow-up care is a key part of your treatment and safety. Be sure to make and go to all " appointments, and call your doctor if you are having problems. It's also a good idea to know your test results and keep a list of the medicines you take.  How can you care for yourself at home?  Avoid loud noises whenever possible. This helps keep your hearing from getting worse.  Always wear hearing protection around loud noises.  Wear a hearing aid as directed.  A professional can help you pick a hearing aid that will work best for you.  You can also get hearing aids over the counter for mild to moderate hearing loss.  Have hearing tests as your doctor suggests. They can show whether your hearing has changed. Your hearing aid may need to be adjusted.  Use other devices as needed. These may include:  Telephone amplifiers and hearing aids that can connect to a television, stereo, radio, or microphone.  Devices that use lights or vibrations. These alert you to the doorbell, a ringing telephone, or a baby monitor.  Television closed-captioning. This shows the words at the bottom of the screen. Most new TVs can do this.  TTY (text telephone). This lets you type messages back and forth on the telephone instead of talking or listening. These devices are also called TDD. When messages are typed on the keyboard, they are sent over the phone line to a receiving TTY. The message is shown on a monitor.  Use text messaging, social media, and email if it is hard for you to communicate by telephone.  Try to learn a listening technique called speechreading. It is not lipreading. You pay attention to people's gestures, expressions, posture, and tone of voice. These clues can help you understand what a person is saying. Face the person you are talking to, and have them face you. Make sure the lighting is good. You need to see the other person's face clearly.  Think about counseling if you need help to adjust to your hearing loss.  When should you call for help?  Watch closely for changes in your health, and be sure to contact your  "doctor if:    You think your hearing is getting worse.     You have new symptoms, such as dizziness or nausea.   Where can you learn more?  Go to https://www.Medopad.net/patiented  Enter R798 in the search box to learn more about \"Hearing Loss: Care Instructions.\"  Current as of: February 28, 2023               Content Version: 13.8 2006-2023 [employer].   Care instructions adapted under license by your healthcare professional. If you have questions about a medical condition or this instruction, always ask your healthcare professional. [employer] disclaims any warranty or liability for your use of this information.      Relationships for Good Health  Relationships are important for our health and happiness. Social isolation, loneliness and lack of support are bad for your health. Studies show that loneliness can harm health and limit your life span as much as high blood pressure and smoking.   Take some time to reflect on your relationships. Then answer these questions:  Are there people in your life that cause you stress or drain your energy? What can you do to set limits?  ________________________________________________________________________________________________________________________________________________________________________________________________________________________________________________________________________________________________________________________________________________  Who do you enjoy spending time with? Who can you go to for support?  ________________________________________________________________________________________________________________________________________________________________________________________________________________________________________________________________________________________________________________________________________________  What can you do to improve your relationships with " others?  __________________________________________________________________________________________________________________________________________________________________________________________________________________  ______________________________________________________________________________________________________________________________  What do you like most about your relationships with others?  ________________________________________________________________________________________________________________________________________________________________________________________________________________________________________________________________________________________________________________________________________________  My goal: ______________________________________________________________________  I will ______________________________________________________________________________________________________________________________________________________________________________________________    For informational purposes only. Not to replace the advice of your health care provider. Copyright   2018 [facility]. All rights reserved. Clinically reviewed by [facility] Health  Team. SMARTworks 164800 - Rev 04/21.    Learning About Stress  What is stress?     Stress is your body's response to a hard situation. Your body can have a physical, emotional, or mental response. Stress is a fact of life for most people, and it affects everyone differently. What causes stress for you may not be stressful for someone else.  A lot of things can cause stress. You may feel stress when you go on a job interview, take a test, or run a race. This kind of short-term stress is normal and even useful. It can help you if you need to work hard or react quickly. For example, stress can help you finish an important job on time.  Long-term stress is caused by ongoing stressful situations or events. Examples  of long-term stress include long-term health problems, ongoing problems at work, or conflicts in your family. Long-term stress can harm your health.  How does stress affect your health?  When you are stressed, your body responds as though you are in danger. It makes hormones that speed up your heart, make you breathe faster, and give you a burst of energy. This is called the fight-or-flight stress response. If the stress is over quickly, your body goes back to normal and no harm is done.  But if stress happens too often or lasts too long, it can have bad effects. Long-term stress can make you more likely to get sick, and it can make symptoms of some diseases worse. If you tense up when you are stressed, you may develop neck, shoulder, or low back pain. Stress is linked to high blood pressure and heart disease.  Stress also harms your emotional health. It can make you [place], tense, or depressed. Your relationships may suffer, and you may not do well at work or school.  What can you do to manage stress?  You can try these things to help manage stress:   Do something active. Exercise or activity can help reduce stress. Walking is a great way to get started. Even everyday activities such as housecleaning or yard work can help.  Try yoga or tim chi. These techniques combine exercise and meditation. You may need some training at first to learn them.  Do something you enjoy. For example, listen to music or go to a movie. Practice your hobby or do volunteer work.  Meditate. This can help you relax, because you are not worrying about what happened before or what may happen in the future.  Do guided imagery. Imagine yourself in any setting that helps you feel calm. You can use online videos, books, or a teacher to guide you.  Do breathing exercises. For example:  From a standing position, bend forward from the waist with your knees slightly bent. Let your arms dangle close to the floor.  Breathe in slowly and deeply as you  "return to a standing position. Roll up slowly and lift your head last.  Hold your breath for just a few seconds in the standing position.  Breathe out slowly and bend forward from the waist.  Let your feelings out. Talk, laugh, cry, and express anger when you need to. Talking with supportive friends or family, a counselor, or a chely leader about your feelings is a healthy way to relieve stress. Avoid discussing your feelings with people who make you feel worse.  Write. It may help to write about things that are bothering you. This helps you find out how much stress you feel and what is causing it. When you know this, you can find better ways to cope.  What can you do to prevent stress?  You might try some of these things to help prevent stress:  Manage your time. This helps you find time to do the things you want and need to do.  Get enough sleep. Your body recovers from the stresses of the day while you are sleeping.  Get support. Your family, friends, and community can make a difference in how you experience stress.  Limit your news feed. Avoid or limit time on social media or news that may make you feel stressed.  Do something active. Exercise or activity can help reduce stress. Walking is a great way to get started.  Where can you learn more?  Go to https://www.Warply.net/patiented  Enter N032 in the search box to learn more about \"Learning About Stress.\"  Current as of: February 26, 2023               Content Version: 13.8    3191-1998 [employer].   Care instructions adapted under license by your healthcare professional. If you have questions about a medical condition or this instruction, always ask your healthcare professional. [employer] disclaims any warranty or liability for your use of this information.      Learning About Sleeping Well  What does sleeping well mean?     Sleeping well means getting enough sleep to feel good and stay healthy. How much sleep is enough varies " among people.  The number of hours you sleep and how you feel when you wake up are both important. If you do not feel refreshed, you probably need more sleep. Another sign of not getting enough sleep is feeling tired during the day.  Experts recommend that adults get at least 7 or more hours of sleep per day. Children and older adults need more sleep.  Why is getting enough sleep important?  Getting enough quality sleep is a basic part of good health. When your sleep suffers, your physical health, mood, and your thoughts can suffer too. You may find yourself feeling more grumpy or stressed. Not getting enough sleep also can lead to serious problems, including injury, accidents, anxiety, and depression.  What might cause poor sleeping?  Many things can cause sleep problems, including:  Changes to your sleep schedule.  Stress. Stress can be caused by fear about a single event, such as giving a speech. Or you may have ongoing stress, such as worry about work or school.  Depression, anxiety, and other mental or emotional conditions.  Changes in your sleep habits or surroundings. This includes changes that happen where you sleep, such as noise, light, or sleeping in a different bed. It also includes changes in your sleep pattern, such as having jet lag or working a late shift.  Health problems, such as pain, breathing problems, and restless legs syndrome.  Lack of regular exercise.  Using alcohol, nicotine, or caffeine before bed.  How can you help yourself?  Here are some tips that may help you sleep more soundly and wake up feeling more refreshed.  Your sleeping area   Use your bedroom only for sleeping and sex. A bit of light reading may help you fall asleep. But if it doesn't, do your reading elsewhere in the house. Try not to use your TV, computer, smartphone, or tablet while you are in bed.  Be sure your bed is big enough to stretch out comfortably, especially if you have a sleep partner.  Keep your bedroom quiet,  "dark, and cool. Use curtains, blinds, or a sleep mask to block out light. To block out noise, use earplugs, soothing music, or a \"white noise\" machine.  Your evening and bedtime routine   Create a relaxing bedtime routine. You might want to take a warm shower or bath, or listen to soothing music.  Go to bed at the same time every night. And get up at the same time every morning, even if you feel tired.  What to avoid   Limit caffeine (coffee, tea, caffeinated sodas) during the day, and don't have any for at least 6 hours before bedtime.  Avoid drinking alcohol before bedtime. Alcohol can cause you to wake up more often during the night.  Try not to smoke or use tobacco, especially in the evening. Nicotine can keep you awake.  Limit naps during the day, especially close to bedtime.  Avoid lying in bed awake for too long. If you can't fall asleep or if you wake up in the middle of the night and can't get back to sleep within about 20 minutes, get out of bed and go to another room until you feel sleepy.  Avoid taking medicine right before bed that may keep you awake or make you feel hyper or energized. Your doctor can tell you if your medicine may do this and if you can take it earlier in the day.  If you can't sleep   Imagine yourself in a peaceful, pleasant scene. Focus on the details and feelings of being in a place that is relaxing.  Get up and do a quiet or boring activity until you feel sleepy.  Avoid drinking any liquids before going to bed to help prevent waking up often to use the bathroom.  Where can you learn more?  Go to https://www.[employer].net/patiented  Enter J942 in the search box to learn more about \"Learning About Sleeping Well.\"  Current as of: July 11, 2023               Content Version: 13.8    7082-4161 [employer].   Care instructions adapted under license by your healthcare professional. If you have questions about a medical condition or this instruction, always ask your " healthcare professional. [employer] disclaims any warranty or liability for your use of this information.      Learning About Depression Screening  What is depression screening?  Depression screening is a way to see if you have depression symptoms. It may be done by a doctor or counselor. It's often part of a routine checkup. That's because your mental health is just as important as your physical health.  Depression is a mental health condition that affects how you feel, think, and act. You may:  Have less energy.  Lose interest in your daily activities.  Feel sad and grouchy for a long time.  Depression is very common. It affects people of all ages.  Many things can lead to depression. Some people become depressed after they have a stroke or find out they have a major illness like cancer or heart disease. The death of a loved one or a breakup may lead to depression. It can run in families. Most experts believe that a combination of inherited genes and stressful life events can cause it.  What happens during screening?  You may be asked to fill out a form about your depression symptoms. You and the doctor will discuss your answers. The doctor may ask you more questions to learn more about how you think, act, and feel.  What happens after screening?  If you have symptoms of depression, your doctor will talk to you about your options.  Doctors usually treat depression with medicines or counseling. Often, combining the two works best. Many people don't get help because they think that they'll get over the depression on their own. But people with depression may not get better unless they get treatment.  The cause of depression is not well understood. There may be many factors involved. But if you have depression, it's not your fault.  A serious symptom of depression is thinking about death or suicide. If you or someone you care about talks about this or about feeling hopeless, get help right away.  It's  "important to know that depression can be treated. Medicine, counseling, and self-care may help.  Where can you learn more?  Go to https://www.[employer].net/patiented  Enter T185 in the search box to learn more about \"Learning About Depression Screening.\"  Current as of: June 25, 2023               Content Version: 13.8    0229-4940 [employer].   Care instructions adapted under license by your healthcare professional. If you have questions about a medical condition or this instruction, always ask your healthcare professional. [employer], [employer] disclaims any warranty or liability for your use of this information.      "

## 2024-02-29 NOTE — LETTER
March 1, 2024      Ma Oma  [address]  [place] [address]        Dear ,    We are writing to inform you of your test results.    Vitamin D is very low.  I will send a prescription for a supplement.  Please take that 1 time per WEEK.  We should recheck your levels in 3 months.    Kidney and liver tests look very good.    Cholesterol a little bit high.  At this level, along with very high blood pressure.  A cholesterol medicine is recommended.  I will send a prescription for this.    We need to get your blood pressure better.  Hopefully we can coordinate with the senior center nurses to continue checking and get this to a controlled level.      Resulted Orders   Vitamin D deficiency screening   Result Value Ref Range    Vitamin D, Total (25-Hydroxy) 13 (L) 20 - 50 ng/mL      Comment:      mild to moderate deficiency    Narrative    Season, race, dietary intake, and treatment affect the concentration of 25-hydroxy-Vitamin D. Values may decrease during winter months and increase during summer months.    Vitamin D determination is routinely performed by an immunoassay specific for 25 hydroxyvitamin D3.  If an individual is on vitamin D2(ergocalciferol) supplementation, please specify 25 OH vitamin D2 and D3 level determination by LCMSMS test VITD23.     CBC with platelets   Result Value Ref Range    WBC Count 6.3 4.0 - 11.0 10e3/uL    RBC Count 4.91 3.80 - 5.20 10e6/uL    Hemoglobin 13.7 11.7 - 15.7 g/dL    Hematocrit 42.7 35.0 - 47.0 %    MCV 87 78 - 100 fL    MCH 27.9 26.5 - 33.0 pg    MCHC 32.1 31.5 - 36.5 g/dL    RDW 12.0 10.0 - 15.0 %    Platelet Count 211 150 - 450 10e3/uL   Lipid panel reflex to direct LDL Non-fasting   Result Value Ref Range    Cholesterol 172 <200 mg/dL    Triglycerides 112 <150 mg/dL    Direct Measure HDL 42 (L) >=50 mg/dL    LDL Cholesterol Calculated 108 (H) <=100 mg/dL    Non HDL Cholesterol 130 (H) <130 mg/dL    Patient Fasting > 8hrs? Unknown     Narrative     Cholesterol  Desirable:  <200 mg/dL    Triglycerides  Normal:  Less than 150 mg/dL  Borderline High:  150-199 mg/dL  High:  200-499 mg/dL  Very High:  Greater than or equal to 500 mg/dL    Direct Measure HDL  Female:  Greater than or equal to 50 mg/dL   Male:  Greater than or equal to 40 mg/dL    LDL Cholesterol  Desirable:  <100mg/dL  Above Desirable:  100-129 mg/dL   Borderline High:  130-159 mg/dL   High:  160-189 mg/dL   Very High:  >= 190 mg/dL    Non HDL Cholesterol  Desirable:  130 mg/dL  Above Desirable:  130-159 mg/dL  Borderline High:  160-189 mg/dL  High:  190-219 mg/dL  Very High:  Greater than or equal to 220 mg/dL   Hemoglobin A1c   Result Value Ref Range    Hemoglobin A1C 6.1 (H) 0.0 - 5.6 %      Comment:      Normal <5.7%   Prediabetes 5.7-6.4%    Diabetes 6.5% or higher     Note: Adopted from ADA consensus guidelines.   TSH with free T4 reflex   Result Value Ref Range    TSH 0.52 0.30 - 4.20 uIU/mL   Basic metabolic panel  (Ca, Cl, CO2, Creat, Gluc, K, Na, BUN)   Result Value Ref Range    Sodium 141 135 - 145 mmol/L      Comment:      Reference intervals for this test were updated on 09/26/2023 to more accurately reflect our healthy population. There may be differences in the flagging of prior results with similar values performed with this method. Interpretation of those prior results can be made in the context of the updated reference intervals.     Potassium 4.5 3.4 - 5.3 mmol/L    Chloride 102 98 - 107 mmol/L    Carbon Dioxide (CO2) 27 22 - 29 mmol/L    Anion Gap 12 7 - 15 mmol/L    Urea Nitrogen 8.8 8.0 - 23.0 mg/dL    Creatinine 0.79 0.51 - 0.95 mg/dL    GFR Estimate 81 >60 mL/min/1.73m2    Calcium 9.4 8.8 - 10.2 mg/dL    Glucose 88 70 - 99 mg/dL   Hepatic panel (Albumin, ALT, AST, Bili, Alk Phos, TP)   Result Value Ref Range    Protein Total 8.1 6.4 - 8.3 g/dL    Albumin 4.3 3.5 - 5.2 g/dL    Bilirubin Total 0.4 <=1.2 mg/dL    Alkaline Phosphatase 113 40 - 150 U/L      Comment:      Reference  intervals for this test were updated on 11/14/2023 to more accurately reflect our healthy population. There may be differences in the flagging of prior results with similar values performed with this method. Interpretation of those prior results can be made in the context of the updated reference intervals.    AST 22 0 - 45 U/L      Comment:      Reference intervals for this test were updated on 6/12/2023 to more accurately reflect our healthy population. There may be differences in the flagging of prior results with similar values performed with this method. Interpretation of those prior results can be made in the context of the updated reference intervals.    ALT 21 0 - 50 U/L      Comment:      Reference intervals for this test were updated on 6/12/2023 to more accurately reflect our healthy population. There may be differences in the flagging of prior results with similar values performed with this method. Interpretation of those prior results can be made in the context of the updated reference intervals.      Bilirubin Direct <0.20 0.00 - 0.30 mg/dL       If you have any questions or concerns, please call the clinic at the number listed above.       Sincerely,      Néstor Rendon MD

## 2024-03-01 LAB
ALBUMIN SERPL BCG-MCNC: 4.3 G/DL (ref 3.5–5.2)
ALP SERPL-CCNC: 113 U/L (ref 40–150)
ALT SERPL W P-5'-P-CCNC: 21 U/L (ref 0–50)
ANION GAP SERPL CALCULATED.3IONS-SCNC: 12 MMOL/L (ref 7–15)
AST SERPL W P-5'-P-CCNC: 22 U/L (ref 0–45)
BILIRUB DIRECT SERPL-MCNC: <0.2 MG/DL (ref 0–0.3)
BILIRUB SERPL-MCNC: 0.4 MG/DL
BUN SERPL-MCNC: 8.8 MG/DL (ref 8–23)
CALCIUM SERPL-MCNC: 9.4 MG/DL (ref 8.8–10.2)
CHLORIDE SERPL-SCNC: 102 MMOL/L (ref 98–107)
CHOLEST SERPL-MCNC: 172 MG/DL
CREAT SERPL-MCNC: 0.79 MG/DL (ref 0.51–0.95)
DEPRECATED HCO3 PLAS-SCNC: 27 MMOL/L (ref 22–29)
EGFRCR SERPLBLD CKD-EPI 2021: 81 ML/MIN/1.73M2
FASTING STATUS PATIENT QL REPORTED: ABNORMAL
GLUCOSE SERPL-MCNC: 88 MG/DL (ref 70–99)
HDLC SERPL-MCNC: 42 MG/DL
LDLC SERPL CALC-MCNC: 108 MG/DL
NONHDLC SERPL-MCNC: 130 MG/DL
POTASSIUM SERPL-SCNC: 4.5 MMOL/L (ref 3.4–5.3)
PROT SERPL-MCNC: 8.1 G/DL (ref 6.4–8.3)
SODIUM SERPL-SCNC: 141 MMOL/L (ref 135–145)
TRIGL SERPL-MCNC: 112 MG/DL
TSH SERPL DL<=0.005 MIU/L-ACNC: 0.52 UIU/ML (ref 0.3–4.2)
URATE SERPL-MCNC: 5.5 MG/DL (ref 2.4–5.7)
VIT D+METAB SERPL-MCNC: 13 NG/ML (ref 20–50)

## 2024-03-01 RX ORDER — ERGOCALCIFEROL 1.25 MG/1
50000 CAPSULE, LIQUID FILLED ORAL WEEKLY
Qty: 12 CAPSULE | Refills: 0 | Status: SHIPPED | OUTPATIENT
Start: 2024-03-01 | End: 2024-05-30

## 2024-03-01 RX ORDER — ATORVASTATIN CALCIUM 20 MG/1
20 TABLET, FILM COATED ORAL DAILY
Qty: 90 TABLET | Refills: 0 | Status: SHIPPED | OUTPATIENT
Start: 2024-03-01

## 2024-03-05 ENCOUNTER — TELEPHONE (OUTPATIENT)
Dept: FAMILY MEDICINE | Facility: CLINIC | Age: 69
End: 2024-03-05
Payer: COMMERCIAL

## 2024-03-05 NOTE — TELEPHONE ENCOUNTER
Patient Quality Outreach    Patient is due for the following:   Breast Cancer Screening - Mammogram    Next Steps:   Schedule a Adult Preventative    Type of outreach:    Sent [employer] message.      Questions for provider review:    None           Andrea Behrend

## 2024-03-07 ENCOUNTER — TELEPHONE (OUTPATIENT)
Dept: FAMILY MEDICINE | Facility: CLINIC | Age: 69
End: 2024-03-07
Payer: COMMERCIAL

## 2024-03-07 NOTE — TELEPHONE ENCOUNTER
Provider completed form for [facility]. No YOSHI on file to fax back to center. Called and left voicemail for daughter Amber (C2C on file) to let her know that we have this form and that she can come and pick it up to give to the Day Center. Sending to scheduling to complete when daughter comes to .

## 2024-03-10 LAB — NONINV COLON CA DNA+OCC BLD SCRN STL QL: NORMAL

## 2024-03-13 ENCOUNTER — TELEPHONE (OUTPATIENT)
Dept: FAMILY MEDICINE | Facility: CLINIC | Age: 69
End: 2024-03-13
Payer: COMMERCIAL

## 2024-03-13 ENCOUNTER — PATIENT OUTREACH (OUTPATIENT)
Dept: GERIATRIC MEDICINE | Facility: CLINIC | Age: 69
End: 2024-03-13
Payer: COMMERCIAL

## 2024-03-13 NOTE — LETTER
March 15, 2024      Sivakumar Ernandez  [address]  [place] [address]        Dear MINDY Shaver [facility] has made several attempts to contact you and your daughter.  We are writing to inform you of your test results.    Vitamin D is very low.  I will send a prescription for a supplement.  Please take that 1 time per WEEK.  We should recheck your levels in 3 months.    Kidney and liver tests look very good.    Cholesterol a little bit high.  At this level, along with very high blood pressure.  A cholesterol medicine is recommended.  I will send a prescription for this.    We need to get your blood pressure better.  Please coordinate with the [facility] to help monitor blood pressure.  Send us readings in 2 weeks.  Please call [facility] at [phone] to schedule a lab only appointment to recheck your Vitamin D.   Also contact clinic with BP readings. Thank you.      Sincerely,        Néstor Rendon MD and [facility] RN

## 2024-03-13 NOTE — TELEPHONE ENCOUNTER
RN made 1st call to pt to relay provider message re: lab results.     Spoke to daughter who requested call back as she is currently at the clinic.     Will try again.    Sylvia BERMUDEZ RN  [facility]     ----- Message -----  From: Néstor Rendon MD  Sent: 3/1/2024  12:09 PM CST  To: [facility] Care Team Pool    Call (may need to call daughter due to hearing impairment outpatient):  We are writing to inform you of your test results.    Vitamin D is very low.  I will send a prescription for a supplement.  Please take that 1 time per WEEK.  We should recheck your levels in 3 months.    Kidney and liver tests look very good.    Cholesterol a little bit high.  At this level, along with very high blood pressure.  A cholesterol medicine is recommended.  I will send a prescription for this.    We need to get your blood pressure better.  Please coordinate with the senior center to help monitor blood pressure.  Send us readings in 2 weeks.

## 2024-03-13 NOTE — TELEPHONE ENCOUNTER
Patient Quality Outreach    Patient is due for the following:   Breast Cancer Screening - Mammogram    Next Steps:   Schedule a Adult Preventative    Type of outreach:    Phone, left message for patient/parent to call back.    Next Steps:  Reach out within 90 days via [employer].    Max number of attempts reached: Yes. Will try again in 90 days if patient still on fail list.    Questions for provider review:    None           Andrea Behrend

## 2024-03-14 ENCOUNTER — TELEPHONE (OUTPATIENT)
Dept: FAMILY MEDICINE | Facility: CLINIC | Age: 69
End: 2024-03-14
Payer: COMMERCIAL

## 2024-03-14 NOTE — TELEPHONE ENCOUNTER
Attempt#1 L/M on cell listed. Phone listed is patient's children. CTC on file. On return call please schedule on RN schedule Cologuard Education with .     ----- Message from Néstor Rendon MD sent at 3/14/2024 11:47 AM CDT -----  Cologuard needs to be sent back with a stool sample in it.

## 2024-03-14 NOTE — PROGRESS NOTES
[facility] Care Coordination Contact    3/11/24    Called adult daughter Amber  to schedule annual HRA home visit. HRA has been scheduled for Wednesday, March 13 at 12:30 PM.    DHRUV Hidalgo  [facility]  [phone]

## 2024-03-14 NOTE — TELEPHONE ENCOUNTER
2nd attempt to call pt and relay lab results. Left message for pt to call clinic back.       Byron Seymour, BSN RN  [facility]

## 2024-03-15 NOTE — TELEPHONE ENCOUNTER
Left message x 2 on daughter Amber phone, CTC on file.  Please review message thread below and advise the patient as indicated. Please schedule if necessary or indicated in message thread.

## 2024-03-15 NOTE — TELEPHONE ENCOUNTER
RN made 3rd  attempt to call pt's daughter and relay lab results. Left message for return call to clinic back. Unable to leave a message.  Per protocol, letter mailed to patient    Message routed to Dr Rendon for Vitamin D lab orders.    Nicolle Fritz RN  [facility]    ----- Message -----  From: Néstor Rendon MD  Sent: 3/1/2024  12:09 PM CST  To: [facility] Care Team Pool    Call (may need to call daughter due to hearing impairment outpatient):  We are writing to inform you of your test results.    Vitamin D is very low.  I will send a prescription for a supplement.  Please take that 1 time per WEEK.  We should recheck your levels in 3 months.    Kidney and liver tests look very good.    Cholesterol a little bit high.  At this level, along with very high blood pressure.  A cholesterol medicine is recommended.  I will send a prescription for this.    We need to get your blood pressure better.  Please coordinate with the senior center to help monitor blood pressure.  Send us readings in 2 weeks.

## 2024-03-16 NOTE — PROGRESS NOTES
[facility] CCDB Assessment   (for members on other waivers)    Home visit for Health Risk Assessment with Sivakumar Ernandez completed on March 13, 2024    Type of residence:: Apartment  Current living arrangement:: I live alone     Assessment completed with:: Patient, Children    Current Care Plan  Member is a recipient of the CADI Waiver program.  Member currently receiving the following home care services:     Member currently receiving the following community resources: Day Care, PCA, Other (see comment), Housekeeping/Chore Agency, Lifeline, Meals on Wheels (Individualized Home Support (IHS))      Medication Review  Medication reconciliation completed in Epic: Yes  Medication set-up & administration: Family/informal caregiver sets up weekly.  Family caregiver administers medications.  Medication Risk Assessment Medication (1 or more, place referral to MTM): N/A: No risk factors identified  MTM Referral Placed: No: No risk factors idenified    Mental/Behavioral Health   Depression Screening:   PHQ-2 Total Score (Adult) - Positive if 3 or more points; Administer PHQ-9 if positive: 2       Mental health DX:: Yes   Mental health DX how managed:: Medication, Other (Attends Adult Day Care)    Falls Assessment:   Fallen 2 or more times in the past year?: No   Any fall with injury in the past year?: No    ADL/IADL Dependencies:   Dependent ADLs:: Ambulation-cane, Dressing, Grooming, Bathing, Positioning, Transfers, Wheelchair-with assist, Toileting, Incontinence  Dependent IADLs:: Cleaning, Cooking, Laundry, Shopping, Meal Preparation, Medication Management, Money Management, Transportation, Incontinence    Health Plan sponsored benefits: [employer]: Shared information regarding preventative health screening and health plan supplemental benefits/incentives. Reviewed medication disposal form.    PCA Assessment completed at visit: No      Care Plan & Recommendations: Member will continue with services authorized through the  CADI Waiver. Member's family will call CC with any questions, concerns, or changes in need.     CADI Care Plan/ISP received.     Follow-Up Plan: Member informed of future contact, plan to f/u with member with a 6 month telephone assessment.  Contact information shared with member and family, encouraged member to call with any questions or concerns at any time.    [facility] care continuum providers: Please see Snapshot and Care Management Flowsheets for Specific details of care plan.    This CC note routed to PCP, Néstor Rendon LSW  [facility]  [phone]

## 2024-03-19 NOTE — TELEPHONE ENCOUNTER
Left message x 3. Please review message thread below and advise the patient as indicated. Please schedule if necessary or indicated in message thread. Completing task please relay if patient calls back

## 2024-03-20 ENCOUNTER — PATIENT OUTREACH (OUTPATIENT)
Dept: GERIATRIC MEDICINE | Facility: CLINIC | Age: 69
End: 2024-03-20
Payer: COMMERCIAL

## 2024-03-20 NOTE — LETTER
March 20, 2024    MADDY ANDRE  [address]  [place] [address]        Dear Ma:    At [facility], we re dedicated to improving your health and wellness. Enclosed is the Care Plan developed with you on 3/17/24. Please review the Care Plan carefully.    As a reminder, during your visit we talked about:  Ways to manage your physical and mental health  Using health care to maintain and improve your health   Your preventive care needs     Remember to contact your care coordinator if you:  Are hospitalized, or plan to be hospitalized   Have a fall    Have a change in your physical or mental health  Need help finding support or services    If you have questions, or don t agree with your Care Plan, call me at [phone]. You can also call me if your needs change. TTY users, call the [place] [employer] at (470) or 1-102.402.6019 (dnisaa-ty-ihjhge relay service).    Sincerely,        DHRUV Hidalgo  [phone]  [email or web address]    [id number] accepted    [id number] (07/2022)

## 2024-03-20 NOTE — PROGRESS NOTES
[facility] Care Coordination Contact    Received after visit chart from care coordinator.  Completed following tasks: Mailed copy of care plan/support plan to member, Mailed [place] Choices signature sheet pages 3-4, Mailed Safe Medication Disposal , and [facility]     Jimmy Polanco  [facility]  Case Management Specialist  [phone]

## 2024-06-05 ENCOUNTER — APPOINTMENT (OUTPATIENT)
Dept: RADIOLOGY | Facility: HOSPITAL | Age: 69
End: 2024-06-05
Attending: STUDENT IN AN ORGANIZED HEALTH CARE EDUCATION/TRAINING PROGRAM
Payer: COMMERCIAL

## 2024-06-05 ENCOUNTER — HOSPITAL ENCOUNTER (EMERGENCY)
Facility: HOSPITAL | Age: 69
Discharge: HOME | End: 2024-06-05
Attending: EMERGENCY MEDICINE | Admitting: EMERGENCY MEDICINE
Payer: COMMERCIAL

## 2024-06-05 ENCOUNTER — APPOINTMENT (OUTPATIENT)
Dept: ULTRASOUND IMAGING | Facility: HOSPITAL | Age: 69
End: 2024-06-05
Payer: COMMERCIAL

## 2024-06-05 VITALS
OXYGEN SATURATION: 100 % | WEIGHT: 143.3 LBS | RESPIRATION RATE: 18 BRPM | BODY MASS INDEX: 30.08 KG/M2 | SYSTOLIC BLOOD PRESSURE: 173 MMHG | TEMPERATURE: 97.9 F | HEIGHT: 58 IN | DIASTOLIC BLOOD PRESSURE: 94 MMHG | HEART RATE: 60 BPM

## 2024-06-05 DIAGNOSIS — S46.211A BICEPS TENDON RUPTURE, PROXIMAL, RIGHT, INITIAL ENCOUNTER: ICD-10-CM

## 2024-06-05 PROCEDURE — 93971 EXTREMITY STUDY: CPT | Mod: RT

## 2024-06-05 PROCEDURE — 99284 EMERGENCY DEPT VISIT MOD MDM: CPT | Mod: 25

## 2024-06-05 PROCEDURE — 73060 X-RAY EXAM OF HUMERUS: CPT | Mod: RT

## 2024-06-05 PROCEDURE — 250N000013 HC RX MED GY IP 250 OP 250 PS 637

## 2024-06-05 RX ORDER — LIDOCAINE 4 G/G
1 PATCH TOPICAL EVERY 24 HOURS
Qty: 5 PATCH | Refills: 0 | Status: SHIPPED | OUTPATIENT
Start: 2024-06-05

## 2024-06-05 RX ORDER — METHOCARBAMOL 500 MG/1
1000 TABLET, FILM COATED ORAL 4 TIMES DAILY PRN
Qty: 20 TABLET | Refills: 0 | Status: SHIPPED | OUTPATIENT
Start: 2024-06-05

## 2024-06-05 RX ORDER — LIDOCAINE 4 G/G
1 PATCH TOPICAL ONCE
Status: DISCONTINUED | OUTPATIENT
Start: 2024-06-05 | End: 2024-06-05 | Stop reason: HOSPADM

## 2024-06-05 RX ORDER — METHOCARBAMOL 500 MG/1
1000 TABLET, FILM COATED ORAL ONCE
Status: COMPLETED | OUTPATIENT
Start: 2024-06-05 | End: 2024-06-05

## 2024-06-05 RX ORDER — IBUPROFEN 600 MG/1
600 TABLET, FILM COATED ORAL ONCE
Status: COMPLETED | OUTPATIENT
Start: 2024-06-05 | End: 2024-06-05

## 2024-06-05 RX ADMIN — LIDOCAINE 1 PATCH: 4 PATCH TOPICAL at 18:03

## 2024-06-05 RX ADMIN — IBUPROFEN 600 MG: 600 TABLET, FILM COATED ORAL at 18:03

## 2024-06-05 RX ADMIN — METHOCARBAMOL 1000 MG: 500 TABLET, FILM COATED ORAL at 18:01

## 2024-06-05 ASSESSMENT — ACTIVITIES OF DAILY LIVING (ADL)
ADLS_ACUITY_SCORE: 35
ADLS_ACUITY_SCORE: 35

## 2024-06-05 ASSESSMENT — ENCOUNTER SYMPTOMS
NUMBNESS: 0
MYALGIAS: 1
COLOR CHANGE: 0

## 2024-06-05 NOTE — ED TRIAGE NOTES
PT has had right upper arm pain for 1 1/2 months.  She thinks it happened after carrying something heavy. There is swelling to the upper arm and pt had it wrapped in a compression sleeve      Triage Assessment (Adult)       Row Name 06/05/24 1558          Triage Assessment    Airway WDL WDL        Respiratory WDL    Respiratory WDL WDL        Skin Circulation/Temperature WDL    Skin Circulation/Temperature WDL WDL        Cognitive/Neuro/Behavioral WDL    Cognitive/Neuro/Behavioral WDL WDL

## 2024-06-05 NOTE — ED PROVIDER NOTES
EMERGENCY DEPARTMENT ENCOUNTER      NAME: Sivakumar Ernandez  AGE: 68 year old female  YOB: 1955  MRN: [id number]  EVALUATION DATE & TIME: No admission date for patient encounter.    PCP: Néstor Rendon    ED PROVIDER: Keke Yuen PA-C      Chief Complaint   Patient presents with    Arm Pain         FINAL IMPRESSION:  1. Biceps tendon rupture, proximal, right, initial encounter          ED COURSE & MEDICAL DECISION MAKIN:18 PM Met with patient for initial interview. Plan for care discussed.  6:12 PM Staffed the patient with Dr. Beckwith.   6:34 PM Spoke with Dr. Beckwith regarding his assessment of the patient.   6:44 PM Reevaluated and updated patient. I discussed the plan for discharge with the patient, and patient is agreeable. We discussed supportive cares at home and reasons for return to the ER including new or worsening symptoms. All questions and concerns addressed. Patient to be discharged by RN.    68 year old female with a history of HTN, anorexia, depression, gout, deafness presents to the Emergency Department for evaluation of non-traumatic right upper arm pain and swelling x1.5 months. Upon exam, patient is afebrile, hypertensive, but in no acute distress. Steve deformity of right upper arm consistent with likely proximal biceps tendon rupture. Tenderness to palpation at proximal biceps tendon and biceps muscle belly. No bony tenderness to palpation. Neurovascularly intact distally. ROM shoulder and elbow limited secondary to pain - flexion and pronation intact, but limited secondary to pain. Compartments soft. No overlying signs of cellulitis or abscess. Differential diagnosis includes but not limited to biceps tendon rupture/tear, fracture, bony lesion, DVT, muscle strain/spasm. Symptoms not consistent with septic joint or compartment syndrome. Based on patient's presenting symptoms, imaging was ordered. XR negative for acute findings to explain pain. US revealed no DVT and over the  area of palpable lump in the mid inner right arm there is no ultrasound abnormality. An incidental finding of 1.1 x 1.2 x 0.5 cm fluid collection within the anterior right shoulder was also noted.  Discussed this finding with radiologist who reports they believe this to be a cyst or bursa, does not look consistent with an abscess and does not look like it goes into the joint space.    Patient was treated with ibuprofen, Robaxin, and lidocaine patch upon arrival with moderate improvement in symptoms. Symptoms and workup most consistent with likely proximal biceps tendon rupture. Patient was made aware of the above findings. Plan to discharge patient home with prescription Robaxin and Lidocaine patches, strict return precautions, and close follow up with their PCP and orthopedics for reevaluation and ongoing management including MRI right upper arm - referral placed today. The patient was stable and well appearing upon discharge. The patient was advised to return to the ER if any new or worsening symptoms develop. The patient verbalizes understanding and agrees with the plan.     Medical Decision Making  Obtained supplemental history:Supplemental history obtained?: Documented in chart and Family Member/Significant Other  Reviewed external records: External records reviewed?: No  Care impacted by chronic illness:Chronic Pain and Diabetes  Care significantly affected by social determinants of health:Access to Medical Care  Did you consider but not order tests?: Work up considered but not performed and documented in chart, if applicable  Did you interpret images independently?: Independent interpretation of ECG and images noted in documentation, when applicable.  Consultation discussion with other provider:Did you involve another provider (consultant, MH, pharmacy, etc.)?: I discussed the care with another health care provider, see documentation for details.  Discharge. I prescribed additional prescription strength  medication(s) as charted. See documentation for any additional details.    MEDICATIONS GIVEN IN THE EMERGENCY:  Medications   Lidocaine (LIDOCARE) 4 % Patch 1 patch (1 patch Transdermal $Patch/Med Applied 6/5/24 1803)   ibuprofen (ADVIL/MOTRIN) tablet 600 mg (600 mg Oral $Given 6/5/24 1803)   methocarbamol (ROBAXIN) tablet 1,000 mg (1,000 mg Oral $Given 6/5/24 1801)       NEW PRESCRIPTIONS STARTED AT TODAY'S ER VISIT  New Prescriptions    LIDOCAINE (LIDOCARE) 4 % PATCH    Place 1 patch onto the skin every 24 hours To prevent lidocaine toxicity, patient should be patch free for 12 hrs daily.    METHOCARBAMOL (ROBAXIN) 500 MG TABLET    Take 2 tablets (1,000 mg) by mouth 4 times daily as needed for muscle spasms          =================================================================    HPI    Patient information was obtained from: the patient and her daughter-in-law    Use of : N/A (Patient waived hospital interpreting service in favor of her family member interpreting) - Language American Sign Language    Sivakumar Ernandez is a 68 year old female with a pertinent history of HTN, unspecified DM, chronic gout of ankle, and chronic pain of left knee, who presents to this ED for evaluation of arm pain.    The patient has had right arm pain for the past 1.5 months. This pain is located at her bicep and radiates into her shoulder when she extends or flexes the muscle. She notes a large bump when she attempts to flex her bicep. She denies any injury or heavy lifting. No swelling into the forearm or hand. No wrist or forearm pain. She has been taking tylenol at home without sufficient pain relief. No history of bleeding or clotting disorders. No recent surgery or long travel. No cancer history. The patient denies redness, rash, numbness, tingling, temperature changes to the arm, and any other symptoms at this time.        REVIEW OF SYSTEMS   Review of Systems   Cardiovascular:         Negative for temperature change in  the right arm   Musculoskeletal:  Positive for myalgias (right bicep w limited ROM).        Negative for wrist or forearm pain   Skin:  Negative for color change and rash.   Neurological:  Negative for numbness.        Negative for tingling of the extremities   All other systems reviewed and are negative.       PAST MEDICAL HISTORY:  Past Medical History:   Diagnosis Date    Fibrocystic breast        PAST SURGICAL HISTORY:  No past surgical history on file.        CURRENT MEDICATIONS:    Lidocaine (LIDOCARE) 4 % Patch  methocarbamol (ROBAXIN) 500 MG tablet  acetaminophen (TYLENOL) 500 MG tablet  atorvastatin (LIPITOR) 20 MG tablet  blood pressure test kit-medium Kit  cetirizine (ZYRTEC) 10 MG tablet  DULoxetine (CYMBALTA) 30 MG capsule  losartan (COZAAR) 100 MG tablet  polyethylene glycol (MIRALAX) 17 GM/Dose powder        ALLERGIES:  No Known Allergies    FAMILY HISTORY:  Family History   Problem Relation Age of Onset    Breast Cancer No family hx of        SOCIAL HISTORY:   Social History     Socioeconomic History    Marital status:    Tobacco Use    Smoking status: Never     Passive exposure: Never    Smokeless tobacco: Never   Vaping Use    Vaping status: Never Used   Substance and Sexual Activity    Alcohol use: No    Drug use: No     Social Determinants of Health     Financial Resource Strain: Low Risk  (2/29/2024)    Financial Resource Strain     Within the past 12 months, have you or your family members you live with been unable to get utilities (heat, electricity) when it was really needed?: No   Food Insecurity: Low Risk  (2/29/2024)    Food Insecurity     Within the past 12 months, did you worry that your food would run out before you got money to buy more?: No     Within the past 12 months, did the food you bought just not last and you didn t have money to get more?: No   Transportation Needs: Low Risk  (2/29/2024)    Transportation Needs     Within the past 12 months, has lack of transportation  "kept you from medical appointments, getting your medicines, non-medical meetings or appointments, work, or from getting things that you need?: No   Physical Activity: Unknown (2/29/2024)    Exercise Vital Sign     Days of Exercise per Week: 0 days   Stress: Stress Concern Present (2/29/2024)    [employer] - Occupational Stress Questionnaire     Feeling of Stress : Rather much   Social Connections: Unknown (2/29/2024)    Social Connection and Isolation Panel [NHANES]     Frequency of Social Gatherings with Friends and Family: Never   Housing Stability: Low Risk  (2/29/2024)    Housing Stability     Do you have housing? : Yes     Are you worried about losing your housing?: No       VITALS:  BP (!) 197/91   Pulse 64   Temp 97.9  F (36.6  C) (Temporal)   Resp 18   Ht 1.473 m (4' 10\")   Wt 65 kg (143 lb 4.8 oz)   LMP  (LMP Unknown)   SpO2 99%   BMI 29.95 kg/m      PHYSICAL EXAM    Constitutional:  Alert, in no acute distress. Cooperative.  EYES: Conjunctivae clear.  HENT:  Atraumatic, normocephalic.  Respiratory:  Respirations even, unlabored, in no acute respiratory distress.  Cardiovascular:  Regular rate, good peripheral perfusion.  GI: Soft, flat, non-distended.  Musculoskeletal: Right upper extremity: Steve deformity of right upper arm consistent with likely proximal biceps tendon rupture. Tenderness to palpation at proximal biceps tendon and biceps muscle belly, which sits inferior in upper arm. No bony tenderness to palpation. No overlying erythema, warmth, purulence, fluctuance, ecchymosis, crepitus, or obvious bony deformity. No obvious joint laxity or effusion. ROM shoulder and elbow limited secondary to pain - flexion and pronation intact, but limited secondary to pain. Neurovascularly intact distally. Cap refill <2 seconds. 2+ radial pulses bilaterally. 5/5 strength. Compartments soft.  Integument: Warm, Dry.   Neurologic:  Alert & oriented. No focal deficits " noted.  Psych: Normal mood and affect.      LAB:  All pertinent labs reviewed and interpreted.  Results for orders placed or performed during the hospital encounter of 06/05/24   Humerus XR, G/E 2 views, right    Impression    IMPRESSION: Negative right humerus. No fracture or bone lesion. Normal joint alignment at the shoulder and elbow. Minimal degenerative changes in the glenohumeral joint. Moderate degenerative arthritic changes in the elbow including partial joint space   narrowing and marginal spurring. Corticated ossicle lateral to the distal humeral epicondyle, sequela of chronic lateral epicondylitis.   US Upper Extremity Venous Duplex Right    Impression    IMPRESSION:  1.  No deep venous thrombosis in the right upper extremity.  2.  Incidental finding of 1.1 x 1.2 x 0.5 cm fluid collection within the anterior right shoulder.       RADIOLOGY:  Reviewed all pertinent imaging. Please see official radiology report.  US Upper Extremity Venous Duplex Right   Final Result   IMPRESSION:   1.  No deep venous thrombosis in the right upper extremity.   2.  Incidental finding of 1.1 x 1.2 x 0.5 cm fluid collection within the anterior right shoulder.      Humerus XR, G/E 2 views, right   Final Result   IMPRESSION: Negative right humerus. No fracture or bone lesion. Normal joint alignment at the shoulder and elbow. Minimal degenerative changes in the glenohumeral joint. Moderate degenerative arthritic changes in the elbow including partial joint space    narrowing and marginal spurring. Corticated ossicle lateral to the distal humeral epicondyle, sequela of chronic lateral epicondylitis.      MR Humerus Upper Arm Right wo & w Contrast    (Results Pending)     IXiomara, am serving as a scribe to document services personally performed by Keke Yuen PA-C based on my observation and the provider's statements to me. I, Keke Yuen PA-C, attest that Xiomara Pete is acting in a scribe capacity, has observed my  performance of the services and has documented them in accordance with my direction.    Keke Yuen PA-C  [facility] EMERGENCY DEPARTMENT  [facility] [facility] [id number] [phone]       Keke Yuen PA-C  06/05/24 4190

## 2024-06-05 NOTE — DISCHARGE INSTRUCTIONS
You were seen in the ER for right arm pain.     Rest, ice/heat, compression with ACE wrap, elevate your extremity, increase activity as tolerates. You may use topical Icy Hot or Lidoderm as needed. You may use Lidocaine patch as needed - only keep in place for 12 hrs out of every 24 hrs (leaving this in place longer than 12 hours can cause toxicity). You may use ibuprofen for swelling/pain and Tylenol as needed for pain. You may use Robaxin as needed for muscle spasms - this medication can cause drowsiness so do not drive if you use this.    Ibuprofen/Naproxen Discharge Instructions:  You may take ibuprofen for pain control.  The maximum dose of (ibuprofen is 3200 mg ) in a 24-hour period.    Take this medication with food to prevent stomach irritation.  With long-term use this medication can irritate the stomach causing pain and lead to development of a stomach ulcer.  If you notice stomach pain or vomiting of coffee-ground colored vomit or blood, please be seen by a healthcare provider.  Attempt to use this medication for the shortest time possible.      Tylenol (Acetaminophen) Discharge Instructions:  You may take 2 tablets of regular strength, over-the-counter, Tylenol (acetaminophen) every 4-6 hours as needed for pain.  Take no more than 4000 mg of Tylenol in a 24-hour period.      Avoid taking more than 1 acetaminophen-containing product at a time and be aware that many over-the-counter medications contain a combination of acetaminophen and other products.  If you are taking Tylenol in addition to a combination product please keep track of your daily acetaminophen dose to make sure you do not exceed the recommended 4000 mg.  Taking too much acetaminophen can cause permanent damage to your liver.    Follow-up with your primary care provider and orthopedics for reevaluation and possible additional imaging, joint steroid injection, and/or physical therapy referral.     Return to the emergency department for any  new or worsening symptoms including increased pain, redness/warmth/drainage/swelling, fever/chills, new weakness, numbness/tingling, decreased range of motion, cold extremity, or any other concerning symptoms.     Take Care!  - Keke Yuen PA-C

## 2024-06-05 NOTE — ED PROVIDER NOTES
"Emergency Department Midlevel Supervisory Note     I had a face to face encounter with this patient seen by the Advanced Practice Provider (CHLOE). I personally made/approved the management plan and take responsibility for the patient management. I personally saw patient and performed a substantive portion of the visit including all aspects of the medical decision making.     ED Course:  6:15 PM Keke Yuen PA-C staffed patient with me. I agree with their assessment and plan of management, and I will see the patient.  6:22 PM   I met with the patient to introduce myself, gather additional history, perform my initial exam, and discuss the plan.     Brief HPI:     Sivakumar Ernandez is a 68 year old female who presents for evaluation of arm pain. The patient has had right arm pain for the past 1.5 months. This pain is located at her bicep and radiates into her shoulder when she extends or flexes the muscle.  No trauma but the symptoms were sudden in onset and have been present for the past month and a half.    I, Compa Beltran, am serving as a scribe to document services personally performed by Jose Beckwith MD, based on my observations and the provider's statements to me.   I, Jose Beckwith MD attest that Compa Beltran was acting in a scribe capacity, has observed my performance of the services and has documented them in accordance with my direction.    Brief Physical Exam: BP (!) 193/84   Pulse 67   Temp 97.9  F (36.6  C) (Temporal)   Resp 18   Ht 1.473 m (4' 10\")   Wt 65 kg (143 lb 4.8 oz)   LMP  (LMP Unknown)   SpO2 98%   BMI 29.95 kg/m    PHYSICAL EXAM    VITAL SIGNS: BP (!) 193/84   Pulse 67   Temp 97.9  F (36.6  C) (Temporal)   Resp 18   Ht 1.473 m (4' 10\")   Wt 65 kg (143 lb 4.8 oz)   LMP  (LMP Unknown)   SpO2 98%   BMI 29.95 kg/m    Constitutional:  Well developed, well nourished  EYES: Conjunctivae clear, no discharge  HENT: Atraumatic, normocephalic, bilateral external ears normal.  Oropharynx " moist. Nose normal.   Neck: Normal ROM , Supple   Respiratory:  No respiratory distress, normal nonlabored respirations.   Cardiovascular:  Distal perfusion appears intact  Musculoskeletal: Patient has inability to flex at the right elbow with a palpable lump which seems consistent with the belly of the biceps tendon rupture proximally  Integument:  Warm, Dry, No erythema, No rash.   Neurologic:  Alert and oriented. No focal deficits noted.  Ambulatory  Psychiatric:  Affect normal, Judgment normal, Mood normal.       MDM:  Subacute presentation for pain to the right upper extremity.  Her examination seem most consistent with a proximal biceps tendon rupture.  I think patient is appropriate for discharge home with planned outpatient MRI which we will order for the patient and the daughter will help schedule and they will follow-up with orthopedics on outpatient basis.  Please see CHLOE note for details.       1. Biceps tendon rupture, proximal, right, initial encounter          Labs and Imaging:  Results for orders placed or performed during the hospital encounter of 06/05/24   Humerus XR, G/E 2 views, right    Impression    IMPRESSION: Negative right humerus. No fracture or bone lesion. Normal joint alignment at the shoulder and elbow. Minimal degenerative changes in the glenohumeral joint. Moderate degenerative arthritic changes in the elbow including partial joint space   narrowing and marginal spurring. Corticated ossicle lateral to the distal humeral epicondyle, sequela of chronic lateral epicondylitis.   US Upper Extremity Venous Duplex Right    Impression    IMPRESSION:  1.  No deep venous thrombosis in the right upper extremity.  2.  Incidental finding of 1.1 x 1.2 x 0.5 cm fluid collection within the anterior right shoulder.       I have reviewed the relevant laboratory studies above.      Procedures:  I was present for the key portions of procedures documented in [facility]LOE/midlevel note, see midlevel note for  further details.    Jose Beckwith MD  [facility] EMERGENCY DEPARTMENT  [facility] [facility] [id number] [phone]     Jose Beckwith MD  06/05/24 4622

## 2024-06-06 ENCOUNTER — PATIENT OUTREACH (OUTPATIENT)
Dept: GERIATRIC MEDICINE | Facility: CLINIC | Age: 69
End: 2024-06-06
Payer: COMMERCIAL

## 2024-06-06 NOTE — PROGRESS NOTES
CC received notification of Emergency Room visit.  ER visit occurred on 6/5/24 at [facility] with Dx of Biceps tendon rupture,right.    CC contacted adult daughter Amber  and left a message requesting a return call.  Member has a follow-up appointment with PCP: No: Offered Assistance with setting up a follow up appointment  Member has had a change in condition: No  New referrals placed: No  Home Visit Needed: No  Care plan reviewed and updated.  PCP notified of ED visit via EMR.    DHRUV Hidalgo  [employer]  [phone]

## 2024-06-10 NOTE — PROGRESS NOTES
CHIEF COMPLAINT: Right shoulder pain    DIAGNOSIS: Right shoulder proximal biceps tendon rupture    OCCUPATION/SPORT: Retired    HPI:   Sivakumar Ernandez is a very pleasant 68 year old, right-hand dominant female who presents for evaluation of right shoulder pain.  Symptoms started in April of 2024. There was not a precipitating event. The pain is located to the right shoulder. Worst pain is rated a 10 of 10, and current pain is rated at 5-6 of 10. Symptoms are worsened by sleeping, internal rotation,and lifting anything. Symptoms are improved with nothing. Patient has tried tylenol, cream with no relief. Patient has some radiating down the arm, no numbness. No other concerns or complaints at this time.  SANE score R - 30 L - 100    PAST MEDICAL HISTORY:  Past Medical History:   Diagnosis Date    Fibrocystic breast        PAST SURGICAL HISTORY:  No past surgical history on file.    CURRENT MEDICATIONS:  Current Outpatient Medications   Medication Sig Dispense Refill    acetaminophen (TYLENOL) 500 MG tablet Take 2 tablets (1,000 mg) by mouth every 8 hours as needed for mild pain 100 tablet 2    atorvastatin (LIPITOR) 20 MG tablet Take 1 tablet (20 mg) by mouth daily 90 tablet 0    blood pressure test kit-medium Kit [BLOOD PRESSURE TEST KIT-MEDIUM KIT]       cetirizine (ZYRTEC) 10 MG tablet Take 1 tablet (10 mg) by mouth daily 30 tablet 5    DULoxetine (CYMBALTA) 30 MG capsule Take 1 capsule (30 mg) by mouth daily 90 capsule 1    Lidocaine (LIDOCARE) 4 % Patch Place 1 patch onto the skin every 24 hours To prevent lidocaine toxicity, patient should be patch free for 12 hrs daily. 5 patch 0    losartan (COZAAR) 100 MG tablet Take 1 tablet (100 mg) by mouth daily 90 tablet 1    methocarbamol (ROBAXIN) 500 MG tablet Take 2 tablets (1,000 mg) by mouth 4 times daily as needed for muscle spasms 20 tablet 0    polyethylene glycol (MIRALAX) 17 GM/Dose powder Take 17 g (1 Capful) by mouth daily 578 g 1       ALLERGIES:    No Known  Allergies      FAMILY HISTORY: No pertinent family history, reviewed in EMR.    SOCIAL HISTORY:   Social History     Socioeconomic History    Marital status:      Spouse name: Not on file    Number of children: Not on file    Years of education: Not on file    Highest education level: Not on file   Occupational History    Not on file   Tobacco Use    Smoking status: Never     Passive exposure: Never    Smokeless tobacco: Never   Vaping Use    Vaping status: Never Used   Substance and Sexual Activity    Alcohol use: No    Drug use: No    Sexual activity: Not on file   Other Topics Concern    Not on file   Social History Narrative    Not on file     Social Determinants of Health     Financial Resource Strain: Low Risk  (2/29/2024)    Financial Resource Strain     Within the past 12 months, have you or your family members you live with been unable to get utilities (heat, electricity) when it was really needed?: No   Food Insecurity: Low Risk  (2/29/2024)    Food Insecurity     Within the past 12 months, did you worry that your food would run out before you got money to buy more?: No     Within the past 12 months, did the food you bought just not last and you didn t have money to get more?: No   Transportation Needs: Low Risk  (2/29/2024)    Transportation Needs     Within the past 12 months, has lack of transportation kept you from medical appointments, getting your medicines, non-medical meetings or appointments, work, or from getting things that you need?: No   Physical Activity: Unknown (2/29/2024)    Exercise Vital Sign     Days of Exercise per Week: 0 days     Minutes of Exercise per Session: Not on file   Stress: Stress Concern Present (2/29/2024)    [employer] - Occupational Stress Questionnaire     Feeling of Stress : Rather much   Social Connections: Unknown (2/29/2024)    Social Connection and Isolation Panel [NHANES]     Frequency of Communication with Friends and Family:  Not on file     Frequency of Social Gatherings with Friends and Family: Never     Attends Shinto Services: Not on file     Active Member of Clubs or Organizations: Not on file     Attends Club or Organization Meetings: Not on file     Marital Status: Not on file   Interpersonal Safety: Not on file   Housing Stability: Low Risk  (2/29/2024)    Housing Stability     Do you have housing? : Yes     Are you worried about losing your housing?: No       REVIEW OF SYSTEMS: Positive for that noted in past medical history and history of present illness and otherwise reviewed in EMR    PHYSICAL EXAM:  Patient is Data Unavailable and weighs 0 lbs 0 oz LMP  (LMP Unknown)   There is no height or weight on file to calculate BMI.   Constitutional: Well-developed, well-nourished, healthy appearing female.  Skin: Warm, dry   HEENT: Normal  Cardiac: Well perfused extremities, strong 2+ peripheral pulses. No edema.   Pulmonary: Breathing room air    Musculoskeletal:   Right shoulder:  Examination was quite limited as the patient had a very hard time understanding even with the  and daughter assisting.  I was unable to assess the patient's strength in the rotator cuff.  The patient does have a notable Steve deformity, palpable distal biceps tendon.  Was able to actively elevate to 90 degrees, passively to 160, I am not sure what the true active range of motion is.  Externally rotated to 20 degrees, passively to 50 degrees but again unable to fully assess with the true active range of motion's.  distally neurovascularly intact.    X-RAYS:   Reviewed the prior humerus x-rays which show no acute osseous abnormality    ADVANCED IMAGING:     IMPRESSION: 68 year old-year-old right hand dominant female, with shoulder proximal biceps tendon rupture.     PLAN:     I discussed with the patient the etiology of their condition. We discussed at length the options as noted above.  This encounter was done with an  as  well as the patient's daughter assisting.  Patient unfortunately had a very hard time understanding and the exam was very limited.  Patient does have a proximal biceps tendon rupture with the Steve deformity.  We discussed that there would not be a surgical indication for this but I cannot assess the patient's rotator cuff due to the limitations of the physical exam and understanding.  Therefore going to order an MRI of the shoulder.  I also recommended physical therapy.  Depending on the results of the MRI may recommend a cortisone injection.  Patient should continue over-the-counter analgesics.    At the conclusion of the office visit, Ma verbally acknowledged that I answered all of her questions satisfactorily.    Shanon Erickson MD  Orthopedic Surgery Sports Medicine and Shoulder Surgery   Cold/Sinus

## 2024-06-12 ENCOUNTER — MEDICAL CORRESPONDENCE (OUTPATIENT)
Dept: HEALTH INFORMATION MANAGEMENT | Facility: CLINIC | Age: 69
End: 2024-06-12
Payer: COMMERCIAL

## 2024-06-13 ENCOUNTER — OFFICE VISIT (OUTPATIENT)
Dept: ORTHOPEDICS | Facility: CLINIC | Age: 69
End: 2024-06-13
Payer: COMMERCIAL

## 2024-06-13 VITALS — WEIGHT: 141 LBS | BODY MASS INDEX: 27.68 KG/M2 | HEIGHT: 60 IN

## 2024-06-13 DIAGNOSIS — S46.211A BICEPS TENDON RUPTURE, PROXIMAL, RIGHT, INITIAL ENCOUNTER: ICD-10-CM

## 2024-06-13 PROCEDURE — 99204 OFFICE O/P NEW MOD 45 MIN: CPT | Performed by: ORTHOPAEDIC SURGERY

## 2024-06-13 NOTE — LETTER
6/13/2024      Sivakumar Ernandez  [address]  [place] [address]      Dear Colleague,    Thank you for referring your patient, Sivakumar Ernandez, to the [facility]. Please see a copy of my visit note below.    CHIEF COMPLAINT: Right shoulder pain    DIAGNOSIS: Right shoulder proximal biceps tendon rupture    OCCUPATION/SPORT: Retired    HPI:   Sivakumar Ernandez is a very pleasant 68 year old, right-hand dominant female who presents for evaluation of right shoulder pain.  Symptoms started in April of 2024. There was not a precipitating event. The pain is located to the right shoulder. Worst pain is rated a 10 of 10, and current pain is rated at 5-6 of 10. Symptoms are worsened by sleeping, internal rotation,and lifting anything. Symptoms are improved with nothing. Patient has tried tylenol, cream with no relief. Patient has some radiating down the arm, no numbness. No other concerns or complaints at this time.  SANE score R - 30 L - 100    PAST MEDICAL HISTORY:  Past Medical History:   Diagnosis Date     Fibrocystic breast        PAST SURGICAL HISTORY:  No past surgical history on file.    CURRENT MEDICATIONS:  Current Outpatient Medications   Medication Sig Dispense Refill     acetaminophen (TYLENOL) 500 MG tablet Take 2 tablets (1,000 mg) by mouth every 8 hours as needed for mild pain 100 tablet 2     atorvastatin (LIPITOR) 20 MG tablet Take 1 tablet (20 mg) by mouth daily 90 tablet 0     blood pressure test kit-medium Kit [BLOOD PRESSURE TEST KIT-MEDIUM KIT]        cetirizine (ZYRTEC) 10 MG tablet Take 1 tablet (10 mg) by mouth daily 30 tablet 5     DULoxetine (CYMBALTA) 30 MG capsule Take 1 capsule (30 mg) by mouth daily 90 capsule 1     Lidocaine (LIDOCARE) 4 % Patch Place 1 patch onto the skin every 24 hours To prevent lidocaine toxicity, patient should be patch free for 12 hrs daily. 5 patch 0     losartan (COZAAR) 100 MG tablet Take 1 tablet (100 mg) by mouth daily 90 tablet 1     methocarbamol  (ROBAXIN) 500 MG tablet Take 2 tablets (1,000 mg) by mouth 4 times daily as needed for muscle spasms 20 tablet 0     polyethylene glycol (MIRALAX) 17 GM/Dose powder Take 17 g (1 Capful) by mouth daily 578 g 1       ALLERGIES:    No Known Allergies      FAMILY HISTORY: No pertinent family history, reviewed in EMR.    SOCIAL HISTORY:   Social History     Socioeconomic History     Marital status:      Spouse name: Not on file     Number of children: Not on file     Years of education: Not on file     Highest education level: Not on file   Occupational History     Not on file   Tobacco Use     Smoking status: Never     Passive exposure: Never     Smokeless tobacco: Never   Vaping Use     Vaping status: Never Used   Substance and Sexual Activity     Alcohol use: No     Drug use: No     Sexual activity: Not on file   Other Topics Concern     Not on file   Social History Narrative     Not on file     Social Determinants of Health     Financial Resource Strain: Low Risk  (2/29/2024)    Financial Resource Strain      Within the past 12 months, have you or your family members you live with been unable to get utilities (heat, electricity) when it was really needed?: No   Food Insecurity: Low Risk  (2/29/2024)    Food Insecurity      Within the past 12 months, did you worry that your food would run out before you got money to buy more?: No      Within the past 12 months, did the food you bought just not last and you didn t have money to get more?: No   Transportation Needs: Low Risk  (2/29/2024)    Transportation Needs      Within the past 12 months, has lack of transportation kept you from medical appointments, getting your medicines, non-medical meetings or appointments, work, or from getting things that you need?: No   Physical Activity: Unknown (2/29/2024)    Exercise Vital Sign      Days of Exercise per Week: 0 days      Minutes of Exercise per Session: Not on file   Stress: Stress Concern Present (2/29/2024)     [facility] [place] [facility] [employer]ational [employer] - Occupational Stress Questionnaire      Feeling of Stress : Rather much   Social Connections: Unknown (2/29/2024)    Social Connection and Isolation Panel [NHANES]      Frequency of Communication with Friends and Family: Not on file      Frequency of Social Gatherings with Friends and Family: Never      Attends Gnosticism Services: Not on file      Active Member of Clubs or Organizations: Not on file      Attends Club or Organization Meetings: Not on file      Marital Status: Not on file   Interpersonal Safety: Not on file   Housing Stability: Low Risk  (2/29/2024)    Housing Stability      Do you have housing? : Yes      Are you worried about losing your housing?: No       REVIEW OF SYSTEMS: Positive for that noted in past medical history and history of present illness and otherwise reviewed in EMR    PHYSICAL EXAM:  Patient is Data Unavailable and weighs 0 lbs 0 oz LMP  (LMP Unknown)   There is no height or weight on file to calculate BMI.   Constitutional: Well-developed, well-nourished, healthy appearing female.  Skin: Warm, dry   HEENT: Normal  Cardiac: Well perfused extremities, strong 2+ peripheral pulses. No edema.   Pulmonary: Breathing room air    Musculoskeletal:   Right shoulder:  Examination was quite limited as the patient had a very hard time understanding even with the  and daughter assisting.  I was unable to assess the patient's strength in the rotator cuff.  The patient does have a notable Steve deformity, palpable distal biceps tendon.  Was able to actively elevate to 90 degrees, passively to 160, I am not sure what the true active range of motion is.  Externally rotated to 20 degrees, passively to 50 degrees but again unable to fully assess with the true active range of motion's.  distally neurovascularly intact.    X-RAYS:   Reviewed the prior humerus x-rays which show no acute osseous abnormality    ADVANCED IMAGING:     IMPRESSION: 68  year old-year-old right hand dominant female, with shoulder proximal biceps tendon rupture.     PLAN:     I discussed with the patient the etiology of their condition. We discussed at length the options as noted above.  This encounter was done with an  as well as the patient's daughter assisting.  Patient unfortunately had a very hard time understanding and the exam was very limited.  Patient does have a proximal biceps tendon rupture with the Steve deformity.  We discussed that there would not be a surgical indication for this but I cannot assess the patient's rotator cuff due to the limitations of the physical exam and understanding.  Therefore going to order an MRI of the shoulder.  I also recommended physical therapy.  Depending on the results of the MRI may recommend a cortisone injection.  Patient should continue over-the-counter analgesics.    At the conclusion of the office visit, Ma verbally acknowledged that I answered all of her questions satisfactorily.    Shanon Quarles MD  Orthopedic Surgery Sports Medicine and Shoulder Surgery      Again, thank you for allowing me to participate in the care of your patient.        Sincerely,        SHANON QUARLES MD

## 2024-06-20 ENCOUNTER — THERAPY VISIT (OUTPATIENT)
Dept: PHYSICAL THERAPY | Facility: CLINIC | Age: 69
End: 2024-06-20
Attending: ORTHOPAEDIC SURGERY
Payer: COMMERCIAL

## 2024-06-20 DIAGNOSIS — M25.511 ACUTE PAIN OF RIGHT SHOULDER: Primary | ICD-10-CM

## 2024-06-20 DIAGNOSIS — S46.211A BICEPS TENDON RUPTURE, PROXIMAL, RIGHT, INITIAL ENCOUNTER: ICD-10-CM

## 2024-06-20 PROCEDURE — 97161 PT EVAL LOW COMPLEX 20 MIN: CPT | Mod: GP

## 2024-06-20 PROCEDURE — 97110 THERAPEUTIC EXERCISES: CPT | Mod: GP

## 2024-06-20 NOTE — PROGRESS NOTES
"PHYSICAL THERAPY EVALUATION  Type of Visit: Evaluation              Subjective       Presenting condition or subjective complaint:    Date of onset: 06/05/24    Relevant medical history:     Dates & types of surgery:      Prior diagnostic imaging/testing results:       Prior therapy history for the same diagnosis, illness or injury:        Prior Level of Function  Transfers:   Ambulation:   ADL:   IADL:     Living Environment  Social support:     Type of home:     Stairs to enter the home:         Ramp:     Stairs inside the home:         Help at home:    Equipment owned:       Employment:      Hobbies/Interests:      Patient goals for therapy:      SUBJECTIVE:  Ma is a 68 year old female who reports via ASL  that her lateral right shoulder has a little bit of pain and is feeling pretty good lately.  She would have pain with sleeping, dressing, reaching behind her. There was awhile where she couldn't lift heavy things, but now it's starting to be better.    Patient reports symptoms of:  Pain    Patient report of Pain:  Pain Rating Now: \"Feeling Pretty Good\"  Pain Location: Right Lateral Shoulder    Progression of Symptoms: Improving after     Patient reports Red Flags symptoms of:  None    OBJECTIVE:  Standing Shoulder Active ROM  Right Shoulder:   Shoulder Flexion: 170 degrees  Shoulder Abduction 170 degrees  Shoulder External Rotation 55 degrees  Shoulder IR Behind Low Back L1 degrees  Shoulder ER Behind Back of Head T1  Left Shoulder:  Shoulder Flexion: 170 degrees  Shoulder Abduction: 170 degrees  Shoulder External Rotation: 80 degrees  Shoulder IR Behind Low Back: L1  Shoulder ER Behind Back of Head: T3    Standing Upper Extremity Manual Muscle Test / Myotome Assessment:  Shoulder Flexion: Right Shoulder 5/5, Left Shoulder 5/5  Shoulder Abduction (C5): Right Shoulder 5/5, Left Shoulder 5/5  Shoulder External Rotation in Neutral: Right Shoulder 5/5, Left Shoulder 5/5  Shoulder Internal Rotation in " Neutral: Right Shoulder 5/5, Left Shoulder 5/5  Elbow Flexion (C6): Right Elbow 5/5, Left Elbow 5/5  Elbow Extension (C7): Right Elbow 5/5, Left Elbow 5/5      Shoulder Special Tests:  Painful Arc: Negative  Austin-Shekhar: Negative  Nithin: Negative  Empty Can: Negative  Neer's: Negative     ASSESSMENT:  Ma is a 68 year old female referred to Physical Therapy for Right biceps tendon rupture   from Shanon Erickson MD.  Ma demonstrates findings of Pain that justify a need for formal Physical Therapy. These impairments interfere with their ability to perform self care tasks, work tasks, recreational activities, household chores, driving , household mobility, and community mobility as compared to their previous level of function.    Medical Diagnosis: Right biceps tendon rupture    Treatment Diagnosis: Acute Right Shoulder Pain     Clinical Decision Making (Complexity):  Clinical Presentation: Stable/Uncomplicated  Clinical Presentation Rationale: based on medical and personal factors listed in PT evaluation  Clinical Decision Making (Complexity): Low complexity      PHYSICAL THERAPY PLAN OF CARE:  Treatment Interventions:  Interventions: Manual Therapy, Neuromuscular Re-education, Therapeutic Activity, Therapeutic Exercise    Long Term Goals     PT Goal 1  Goal Identifier: Home exercise program  Goal Description: Patient will have a home exercise program she can perform at home to strengthen her right shoulder to improve her functional ability  Rationale: to maximize safety and independence with performance of ADLs and functional tasks;to maximize safety and independence within the home;to maximize safety and independence with transportation;to maximize safety and independence within the community;to maximize safety and independence with self cares  Goal Progress: Tolerated well and will perform at home  Target Date: 07/20/24  Date Met: 06/20/24    Frequency of Treatment: 1 evaluation and treatment  Duration of  Treatment: 1 month         Risks and benefits of evaluation/treatment have been explained.   Patient/Family/caregiver agrees with Plan of Care.      Evaluation Time:     PT Kathyal, Low Complexity Minutes (47976): 20         Signing Clinician: Valerio Carpenter PT        SUMMARY OF PLAN OF CARE:  Ma was referred to physical therapy by Dr. Erickson after an orthopedic surgery consult following a biceps tear and alison deformity.  We had the opportunity to assess her strength, ROM and perform special tests for her rotator cuff and found she has 5/5 strength without any pain in her right shoulder, near equal ROM as well as negative special tests for impingement, rotator cuff tear or rotator cuff involvement.  She actually states the medication provided to her by Dr. Erickson was helping greatly with her pain.  Given it looks like she has minimal rotator cuff improvement other than some slight asymmetrical weakness, I think she would respond great to a general rotator cuff strengthening home exercise program that she can perform at home.            [facility]                                                                                   OUTPATIENT PHYSICAL THERAPY      PLAN OF TREATMENT FOR OUTPATIENT REHABILITATION   Patient's Last Name, First Name, M.I.  OmaMa    YOB: 1955   Provider's Name   [facility]   Medical Record No.  [id number]     Onset Date: 06/05/24  Start of Care Date: 06/20/24     Medical Diagnosis:  Right biceps tendon rupture      PT Treatment Diagnosis:  Acute Right Shoulder Pain Plan of Treatment  Frequency/Duration: 1 evaluation and treatment/ 1 month    Certification date from 06/20/24 to 07/20/24         See note for plan of treatment details and functional goals     Valerio Carpenter, PT                         I CERTIFY THE NEED FOR THESE SERVICES FURNISHED UNDER        THIS PLAN OF TREATMENT AND WHILE UNDER MY CARE     (Physician  attestation of this document indicates review and certification of the therapy plan).              Referring Provider:  Shanon Erickson    Initial Assessment  See Epic Evaluation- Start of Care Date: 06/20/24

## 2024-06-23 ENCOUNTER — HEALTH MAINTENANCE LETTER (OUTPATIENT)
Age: 69
End: 2024-06-23

## 2024-07-02 ENCOUNTER — TELEPHONE (OUTPATIENT)
Dept: FAMILY MEDICINE | Facility: CLINIC | Age: 69
End: 2024-07-02
Payer: COMMERCIAL

## 2024-07-02 NOTE — TELEPHONE ENCOUNTER
Patient Quality Outreach    Patient is due for the following:   Breast Cancer Screening - Mammogram    Next Steps:   Schedule a Adult Preventative    Type of outreach:    Sent [facility] message. Closing encounter as I will not be available for follow up until 7/25/24.        Questions for provider review:    None           Andrea Behrend

## 2024-07-03 ENCOUNTER — MEDICAL CORRESPONDENCE (OUTPATIENT)
Dept: HEALTH INFORMATION MANAGEMENT | Facility: CLINIC | Age: 69
End: 2024-07-03
Payer: COMMERCIAL

## 2024-07-03 ENCOUNTER — TELEPHONE (OUTPATIENT)
Dept: FAMILY MEDICINE | Facility: CLINIC | Age: 69
End: 2024-07-03
Payer: COMMERCIAL

## 2024-07-03 NOTE — TELEPHONE ENCOUNTER
Forms/Letter Request    Type of form/letter: OTHER: [employer]-supplies pull up       Do we have the form/letter: Yes:     Who is the form from? [facility] [employer] [facility]- (if other please explain)    Where did/will the form come from? form was faxed in    When is form/letter needed by: na    How would you like the form/letter returned: Fax : [phone]

## 2024-07-11 NOTE — TELEPHONE ENCOUNTER
Form signed by provider and faxed back to [facility] [employer]. Sent to [facility] for scanning. Completing task.

## 2024-07-18 ENCOUNTER — ANCILLARY PROCEDURE (OUTPATIENT)
Dept: MRI IMAGING | Facility: CLINIC | Age: 69
End: 2024-07-18
Attending: ORTHOPAEDIC SURGERY
Payer: COMMERCIAL

## 2024-07-18 DIAGNOSIS — S46.211A BICEPS TENDON RUPTURE, PROXIMAL, RIGHT, INITIAL ENCOUNTER: ICD-10-CM

## 2024-07-18 PROCEDURE — 73221 MRI JOINT UPR EXTREM W/O DYE: CPT | Mod: RT | Performed by: RADIOLOGY

## 2024-07-23 NOTE — PROGRESS NOTES
CHIEF COMPLAINT: Right shoulder pain    DIAGNOSIS: Right shoulder proximal biceps tendon rupture, degenerative rotator cuff tear    OCCUPATION/SPORT: Retired    HPI:   Sivakumar Ernandez is a very pleasant 68 year old, right-hand dominant female who presents for evaluation of right shoulder pain. Patient had MRI completed on 7/18/24 and is here to review.   SANE score R - 50 L - 100    PAST MEDICAL HISTORY:  Past Medical History:   Diagnosis Date    Fibrocystic breast        PAST SURGICAL HISTORY:  No past surgical history on file.    CURRENT MEDICATIONS:  Current Outpatient Medications   Medication Sig Dispense Refill    acetaminophen (TYLENOL) 500 MG tablet Take 2 tablets (1,000 mg) by mouth every 8 hours as needed for mild pain 100 tablet 2    atorvastatin (LIPITOR) 20 MG tablet Take 1 tablet (20 mg) by mouth daily 90 tablet 0    blood pressure test kit-medium Kit [BLOOD PRESSURE TEST KIT-MEDIUM KIT]       cetirizine (ZYRTEC) 10 MG tablet Take 1 tablet (10 mg) by mouth daily 30 tablet 5    DULoxetine (CYMBALTA) 30 MG capsule Take 1 capsule (30 mg) by mouth daily 90 capsule 1    Lidocaine (LIDOCARE) 4 % Patch Place 1 patch onto the skin every 24 hours To prevent lidocaine toxicity, patient should be patch free for 12 hrs daily. 5 patch 0    losartan (COZAAR) 100 MG tablet Take 1 tablet (100 mg) by mouth daily 90 tablet 1    methocarbamol (ROBAXIN) 500 MG tablet Take 2 tablets (1,000 mg) by mouth 4 times daily as needed for muscle spasms 20 tablet 0    polyethylene glycol (MIRALAX) 17 GM/Dose powder Take 17 g (1 Capful) by mouth daily 578 g 1       ALLERGIES:    No Known Allergies      FAMILY HISTORY: No pertinent family history, reviewed in EMR.    SOCIAL HISTORY:   Social History     Socioeconomic History    Marital status:      Spouse name: Not on file    Number of children: Not on file    Years of education: Not on file    Highest education level: Not on file   Occupational History    Not on file   Tobacco Use     Smoking status: Never     Passive exposure: Never    Smokeless tobacco: Never   Vaping Use    Vaping status: Never Used   Substance and Sexual Activity    Alcohol use: No    Drug use: No    Sexual activity: Not on file   Other Topics Concern    Not on file   Social History Narrative    Not on file     Social Determinants of Health     Financial Resource Strain: Low Risk  (2/29/2024)    Financial Resource Strain     Within the past 12 months, have you or your family members you live with been unable to get utilities (heat, electricity) when it was really needed?: No   Food Insecurity: Low Risk  (2/29/2024)    Food Insecurity     Within the past 12 months, did you worry that your food would run out before you got money to buy more?: No     Within the past 12 months, did the food you bought just not last and you didn t have money to get more?: No   Transportation Needs: Low Risk  (2/29/2024)    Transportation Needs     Within the past 12 months, has lack of transportation kept you from medical appointments, getting your medicines, non-medical meetings or appointments, work, or from getting things that you need?: No   Physical Activity: Unknown (2/29/2024)    Exercise Vital Sign     Days of Exercise per Week: 0 days     Minutes of Exercise per Session: Not on file   Stress: Stress Concern Present (2/29/2024)    [employer] [place] [employer] - Occupational Stress Questionnaire     Feeling of Stress : Rather much   Social Connections: Unknown (2/29/2024)    Social Connection and Isolation Panel [NHANES]     Frequency of Communication with Friends and Family: Not on file     Frequency of Social Gatherings with Friends and Family: Never     Attends Yazidism Services: Not on file     Active Member of Clubs or Organizations: Not on file     Attends Club or Organization Meetings: Not on file     Marital Status: Not on file   Interpersonal Safety: Not on file   Housing Stability: Low Risk  (2/29/2024)    Housing  Stability     Do you have housing? : Yes     Are you worried about losing your housing?: No       REVIEW OF SYSTEMS: Positive for that noted in past medical history and history of present illness and otherwise reviewed in EMR    PHYSICAL EXAM:  Patient is Data Unavailable and weighs 0 lbs 0 oz LMP  (LMP Unknown)   There is no height or weight on file to calculate BMI.   Constitutional: Well-developed, well-nourished, healthy appearing female.  Skin: Warm, dry   HEENT: Normal  Cardiac: Well perfused extremities, strong 2+ peripheral pulses. No edema.   Pulmonary: Breathing room air    Musculoskeletal:   Right shoulder:  Examination was quite limited as the patient had a very hard time understanding even with the  and daughter assisting.  I was unable to assess the patient's strength in the rotator cuff.  The patient does have a notable Steve deformity, palpable distal biceps tendon.  Was able to actively elevate to 90 degrees, passively to 160, I am not sure what the true active range of motion is.  Externally rotated to 20 degrees, passively to 50 degrees but again unable to fully assess with the true active range of motion's.  distally neurovascularly intact.    X-RAYS:   Reviewed the prior humerus x-rays which show no acute osseous abnormality    ADVANCED IMAGING: I personally reviewed the MRI which demonstrates a proximal biceps rupture, tear of the rotator cuff with grade 1-2 atrophy    IMPRESSION: 68 year old-year-old right hand dominant female, with shoulder proximal biceps tendon rupture, degenerative rotator cuff tear.     PLAN:     I discussed with the patient the etiology of their condition. We discussed at length the options as noted above.  This encounter was done with an  as well as the patient's daughter assisting.  We went through the MRI and noted that the patient has a proximal biceps rupture and degenerative rotator cuff tear. We discussed with operative repair the  chance of failure due to age, atrophy, tear size. We therefore discussed conservative measures including PT, cortisone injection, over the counter analgesics. Patient wishes to pursue PT, an order was placed. At the conclusion of the office visit, Ma verbally acknowledged that I answered all of her questions satisfactorily.    Shanon Erickson MD  Orthopedic Surgery Sports Medicine and Shoulder Surgery

## 2024-07-25 ENCOUNTER — OFFICE VISIT (OUTPATIENT)
Dept: ORTHOPEDICS | Facility: CLINIC | Age: 69
End: 2024-07-25
Payer: COMMERCIAL

## 2024-07-25 DIAGNOSIS — S46.211A BICEPS RUPTURE, PROXIMAL, RIGHT, INITIAL ENCOUNTER: ICD-10-CM

## 2024-07-25 DIAGNOSIS — M75.121 NONTRAUMATIC COMPLETE TEAR OF RIGHT ROTATOR CUFF: Primary | ICD-10-CM

## 2024-07-25 PROCEDURE — 99214 OFFICE O/P EST MOD 30 MIN: CPT | Performed by: ORTHOPAEDIC SURGERY

## 2024-07-25 NOTE — LETTER
7/25/2024      Sivakumar Ernandez  [address]  [place] [address]      Dear Colleague,    Thank you for referring your patient, Sivakumar Ernandez, to the [facility]. Please see a copy of my visit note below.    CHIEF COMPLAINT: Right shoulder pain    DIAGNOSIS: Right shoulder proximal biceps tendon rupture, degenerative rotator cuff tear    OCCUPATION/SPORT: Retired    HPI:   Sivakumar Ernandez is a very pleasant 68 year old, right-hand dominant female who presents for evaluation of right shoulder pain. Patient had MRI completed on 7/18/24 and is here to review.   SANE score R - 50 L - 100    PAST MEDICAL HISTORY:  Past Medical History:   Diagnosis Date     Fibrocystic breast        PAST SURGICAL HISTORY:  No past surgical history on file.    CURRENT MEDICATIONS:  Current Outpatient Medications   Medication Sig Dispense Refill     acetaminophen (TYLENOL) 500 MG tablet Take 2 tablets (1,000 mg) by mouth every 8 hours as needed for mild pain 100 tablet 2     atorvastatin (LIPITOR) 20 MG tablet Take 1 tablet (20 mg) by mouth daily 90 tablet 0     blood pressure test kit-medium Kit [BLOOD PRESSURE TEST KIT-MEDIUM KIT]        cetirizine (ZYRTEC) 10 MG tablet Take 1 tablet (10 mg) by mouth daily 30 tablet 5     DULoxetine (CYMBALTA) 30 MG capsule Take 1 capsule (30 mg) by mouth daily 90 capsule 1     Lidocaine (LIDOCARE) 4 % Patch Place 1 patch onto the skin every 24 hours To prevent lidocaine toxicity, patient should be patch free for 12 hrs daily. 5 patch 0     losartan (COZAAR) 100 MG tablet Take 1 tablet (100 mg) by mouth daily 90 tablet 1     methocarbamol (ROBAXIN) 500 MG tablet Take 2 tablets (1,000 mg) by mouth 4 times daily as needed for muscle spasms 20 tablet 0     polyethylene glycol (MIRALAX) 17 GM/Dose powder Take 17 g (1 Capful) by mouth daily 578 g 1       ALLERGIES:    No Known Allergies      FAMILY HISTORY: No pertinent family history, reviewed in EMR.    SOCIAL HISTORY:   Social History      Socioeconomic History     Marital status:      Spouse name: Not on file     Number of children: Not on file     Years of education: Not on file     Highest education level: Not on file   Occupational History     Not on file   Tobacco Use     Smoking status: Never     Passive exposure: Never     Smokeless tobacco: Never   Vaping Use     Vaping status: Never Used   Substance and Sexual Activity     Alcohol use: No     Drug use: No     Sexual activity: Not on file   Other Topics Concern     Not on file   Social History Narrative     Not on file     Social Determinants of Health     Financial Resource Strain: Low Risk  (2/29/2024)    Financial Resource Strain      Within the past 12 months, have you or your family members you live with been unable to get utilities (heat, electricity) when it was really needed?: No   Food Insecurity: Low Risk  (2/29/2024)    Food Insecurity      Within the past 12 months, did you worry that your food would run out before you got money to buy more?: No      Within the past 12 months, did the food you bought just not last and you didn t have money to get more?: No   Transportation Needs: Low Risk  (2/29/2024)    Transportation Needs      Within the past 12 months, has lack of transportation kept you from medical appointments, getting your medicines, non-medical meetings or appointments, work, or from getting things that you need?: No   Physical Activity: Unknown (2/29/2024)    Exercise Vital Sign      Days of Exercise per Week: 0 days      Minutes of Exercise per Session: Not on file   Stress: Stress Concern Present (2/29/2024)    [employer] [place] [employer] - Occupational Stress Questionnaire      Feeling of Stress : Rather much   Social Connections: Unknown (2/29/2024)    Social Connection and Isolation Panel [NHANES]      Frequency of Communication with Friends and Family: Not on file      Frequency of Social Gatherings with Friends and Family: Never      Attends  Anabaptist Services: Not on file      Active Member of Clubs or Organizations: Not on file      Attends Club or Organization Meetings: Not on file      Marital Status: Not on file   Interpersonal Safety: Not on file   Housing Stability: Low Risk  (2/29/2024)    Housing Stability      Do you have housing? : Yes      Are you worried about losing your housing?: No       REVIEW OF SYSTEMS: Positive for that noted in past medical history and history of present illness and otherwise reviewed in EMR    PHYSICAL EXAM:  Patient is Data Unavailable and weighs 0 lbs 0 oz LMP  (LMP Unknown)   There is no height or weight on file to calculate BMI.   Constitutional: Well-developed, well-nourished, healthy appearing female.  Skin: Warm, dry   HEENT: Normal  Cardiac: Well perfused extremities, strong 2+ peripheral pulses. No edema.   Pulmonary: Breathing room air    Musculoskeletal:   Right shoulder:  Examination was quite limited as the patient had a very hard time understanding even with the  and daughter assisting.  I was unable to assess the patient's strength in the rotator cuff.  The patient does have a notable Steve deformity, palpable distal biceps tendon.  Was able to actively elevate to 90 degrees, passively to 160, I am not sure what the true active range of motion is.  Externally rotated to 20 degrees, passively to 50 degrees but again unable to fully assess with the true active range of motion's.  distally neurovascularly intact.    X-RAYS:   Reviewed the prior humerus x-rays which show no acute osseous abnormality    ADVANCED IMAGING: I personally reviewed the MRI which demonstrates a proximal biceps rupture, tear of the rotator cuff with grade 1-2 atrophy    IMPRESSION: 68 year old-year-old right hand dominant female, with shoulder proximal biceps tendon rupture, degenerative rotator cuff tear.     PLAN:     I discussed with the patient the etiology of their condition. We discussed at length the  options as noted above.  This encounter was done with an  as well as the patient's daughter assisting.  We went through the MRI and noted that the patient has a proximal biceps rupture and degenerative rotator cuff tear. We discussed with operative repair the chance of failure due to age, atrophy, tear size. We therefore discussed conservative measures including PT, cortisone injection, over the counter analgesics. Patient wishes to pursue PT, an order was placed. At the conclusion of the office visit, Ma verbally acknowledged that I answered all of her questions satisfactorily.    Shanon Quarles MD  Orthopedic Surgery Sports Medicine and Shoulder Surgery      Again, thank you for allowing me to participate in the care of your patient.        Sincerely,        SHANON QUARLES MD

## 2024-08-05 ENCOUNTER — PATIENT OUTREACH (OUTPATIENT)
Dept: GERIATRIC MEDICINE | Facility: CLINIC | Age: 69
End: 2024-08-05
Payer: COMMERCIAL

## 2024-08-05 NOTE — PROGRESS NOTES
[facility] Care Coordination Contact       CHW Called member and spoke w/  Dtr Amber  to remind member to schedule Colonoscopy and Mammogram exam(s).   Amber, noted Mbr declines  Mammogram and  has talked to PCP. She will follow up to schedule Colon screening.      CHEKO Khanna  [facility]  [phone]

## 2024-08-12 ENCOUNTER — TELEPHONE (OUTPATIENT)
Dept: FAMILY MEDICINE | Facility: CLINIC | Age: 69
End: 2024-08-12
Payer: COMMERCIAL

## 2024-08-12 NOTE — TELEPHONE ENCOUNTER
Forms/Letter Request    Type of form/letter: OTHER: [facility]       Do we have the form/letter: Yes:     Who is the form from? [facility]     Where did/will the form come from? form was faxed in    When is form/letter needed by: na    How would you like the form/letter returned: Fax : [phone]

## 2024-08-21 ENCOUNTER — MEDICAL CORRESPONDENCE (OUTPATIENT)
Dept: HEALTH INFORMATION MANAGEMENT | Facility: CLINIC | Age: 69
End: 2024-08-21
Payer: COMMERCIAL

## 2024-08-22 NOTE — TELEPHONE ENCOUNTER
Form signed by provider and faxed back to [facility]. [facility]. Completing task.

## 2024-10-22 ENCOUNTER — PATIENT OUTREACH (OUTPATIENT)
Dept: GERIATRIC MEDICINE | Facility: CLINIC | Age: 69
End: 2024-10-22
Payer: COMMERCIAL

## 2024-10-22 NOTE — PROGRESS NOTES
[facility] Six-Month Telephone Assessment    6 month telephone assessment completed on 10/22/24.    ER visits: No  Hospitalizations: No  TCU stays: No  Significant health status changes: None reported.   Falls/Injuries: No  ADL/IADL changes: No  Changes in services: No    Caregiver Assessment follow up:  N/A    Goals: See Support Plan for goal progress documentation.      Will see member in 6 months for an annual health risk assessment.   Encouraged member to call CC with any questions or concerns in the meantime.       DHRUV Hidalgo  [facility]  [phone]

## 2025-01-09 ENCOUNTER — PATIENT OUTREACH (OUTPATIENT)
Dept: GERIATRIC MEDICINE | Facility: CLINIC | Age: 70
End: 2025-01-09
Payer: COMMERCIAL

## 2025-02-03 ENCOUNTER — TELEPHONE (OUTPATIENT)
Dept: FAMILY MEDICINE | Facility: CLINIC | Age: 70
End: 2025-02-03
Payer: COMMERCIAL

## 2025-02-03 NOTE — TELEPHONE ENCOUNTER
Forms/Letter Request    Type of form/letter: Home Health Diagnosis Request  for home health care services([facility])      Do we have the form/letter: Yes:     Who is the form from? Home care[facility]    Where did/will the form come from? form was faxed in    When is form/letter needed by: na    How would you like the form/letter returned: Fax : [phone]

## 2025-02-19 ENCOUNTER — PATIENT OUTREACH (OUTPATIENT)
Dept: GERIATRIC MEDICINE | Facility: CLINIC | Age: 70
End: 2025-02-19
Payer: COMMERCIAL

## 2025-02-21 ENCOUNTER — PATIENT OUTREACH (OUTPATIENT)
Dept: GERIATRIC MEDICINE | Facility: CLINIC | Age: 70
End: 2025-02-21
Payer: COMMERCIAL

## 2025-02-27 NOTE — PROGRESS NOTES
[facility] CCDB Assessment   (for members on other waivers)    Home visit for Health Risk Assessment with Sivakumar Ernandez completed on February 21, 2025    Type of residence:: Apartment  Current living arrangement:: I live alone     Assessment completed with:: Patient, Children    Current Care Plan  Member is a recipient of the CADI Waiver program.  Member currently receiving the following home care services:     Member currently receiving the following community resources: Day Care, PCA, Other (see comment), Housekeeping/Chore Agency, Lifeline, Meals on Wheels (Individualized Home Support (IHS))      Medication Review  Medication reconciliation completed in Epic: Yes  Medication set-up & administration: Family/informal caregiver sets up weekly.  Family caregiver administers medications.  Medication Risk Assessment Medication (1 or more, place referral to MTM): N/A: No risk factors identified  MTM Referral Placed: No: No risk factors idenified    Mental/Behavioral Health   Depression Screening:   PHQ-2 Total Score (Adult) - Positive if 3 or more points; Administer PHQ-9 if positive: 2       Mental health DX:: Yes   Mental health DX how managed:: Medication, Other (Attends Adult Day Care)    Falls Assessment:   Fallen 2 or more times in the past year?: (!) Yes   Any fall with injury in the past year?: No    ADL/IADL Dependencies:   Dependent ADLs:: Ambulation-cane, Dressing, Grooming, Bathing, Positioning, Transfers, Wheelchair-with assist, Toileting, Incontinence  Dependent IADLs:: Cleaning, Cooking, Laundry, Shopping, Meal Preparation, Medication Management, Money Management, Transportation, Incontinence    Health Plan sponsored benefits: [employer] [facility]: Shared information regarding preventative health screening and health plan supplemental benefits/incentives. Reviewed medication disposal form and transition of care member handout.    CFSS Assessment completed at visit: No      Care Plan & Recommendations: Member will  continue with the services authorized through the CADI Waiver program. Member will call CC Alexandra with any questions, concerns, or changes in needs.     [employer].     [place] message sent to [employer]  with notification of HRA being complete and HRA support [place] in [place] for their review.    Follow-Up Plan: Member informed of future contact, plan to f/u with member with a 6 month telephone assessment.  Contact information shared with member and family, encouraged member to call with any questions or concerns at any time.    [place] care continuum providers: Please see Snapshot and Care Management Flowsheets for Specific details of care plan.    This CC note routed to PCP, éNstor Rendon LSW  [facility]  [phone]

## 2025-03-04 ENCOUNTER — OFFICE VISIT (OUTPATIENT)
Dept: FAMILY MEDICINE | Facility: CLINIC | Age: 70
End: 2025-03-04
Attending: FAMILY MEDICINE
Payer: COMMERCIAL

## 2025-03-04 ENCOUNTER — ORDERS ONLY (AUTO-RELEASED) (OUTPATIENT)
Dept: FAMILY MEDICINE | Facility: CLINIC | Age: 70
End: 2025-03-04

## 2025-03-04 ENCOUNTER — ANCILLARY PROCEDURE (OUTPATIENT)
Dept: MAMMOGRAPHY | Facility: CLINIC | Age: 70
End: 2025-03-04
Attending: FAMILY MEDICINE
Payer: COMMERCIAL

## 2025-03-04 VITALS
DIASTOLIC BLOOD PRESSURE: 116 MMHG | RESPIRATION RATE: 18 BRPM | SYSTOLIC BLOOD PRESSURE: 190 MMHG | HEART RATE: 85 BPM | HEIGHT: 58 IN | WEIGHT: 146 LBS | TEMPERATURE: 98.9 F | BODY MASS INDEX: 30.64 KG/M2

## 2025-03-04 DIAGNOSIS — Z12.11 SCREEN FOR COLON CANCER: ICD-10-CM

## 2025-03-04 DIAGNOSIS — E55.9 VITAMIN D DEFICIENCY: ICD-10-CM

## 2025-03-04 DIAGNOSIS — G62.9 NEUROPATHY: ICD-10-CM

## 2025-03-04 DIAGNOSIS — Z00.00 ROUTINE GENERAL MEDICAL EXAMINATION AT A HEALTH CARE FACILITY: Primary | ICD-10-CM

## 2025-03-04 DIAGNOSIS — I10 ESSENTIAL HYPERTENSION: ICD-10-CM

## 2025-03-04 DIAGNOSIS — Z12.31 VISIT FOR SCREENING MAMMOGRAM: ICD-10-CM

## 2025-03-04 DIAGNOSIS — Z11.1 SCREENING EXAMINATION FOR PULMONARY TUBERCULOSIS: ICD-10-CM

## 2025-03-04 PROBLEM — F32.9 MAJOR DEPRESSION: Status: RESOLVED | Noted: 2020-02-05 | Resolved: 2025-03-04

## 2025-03-04 LAB
ANION GAP SERPL CALCULATED.3IONS-SCNC: 10 MMOL/L (ref 7–15)
BUN SERPL-MCNC: 13.3 MG/DL (ref 8–23)
CALCIUM SERPL-MCNC: 9.8 MG/DL (ref 8.8–10.4)
CHLORIDE SERPL-SCNC: 103 MMOL/L (ref 98–107)
CHOLEST SERPL-MCNC: 210 MG/DL
CREAT SERPL-MCNC: 0.81 MG/DL (ref 0.51–0.95)
EGFRCR SERPLBLD CKD-EPI 2021: 78 ML/MIN/1.73M2
ERYTHROCYTE [DISTWIDTH] IN BLOOD BY AUTOMATED COUNT: 13.3 % (ref 10–15)
FASTING STATUS PATIENT QL REPORTED: NO
FASTING STATUS PATIENT QL REPORTED: NO
FERRITIN SERPL-MCNC: 90 NG/ML (ref 11–328)
GLUCOSE SERPL-MCNC: 95 MG/DL (ref 70–99)
HCO3 SERPL-SCNC: 28 MMOL/L (ref 22–29)
HCT VFR BLD AUTO: 41.4 % (ref 35–47)
HDLC SERPL-MCNC: 51 MG/DL
HGB BLD-MCNC: 13.5 G/DL (ref 11.7–15.7)
LDLC SERPL CALC-MCNC: 136 MG/DL
MCH RBC QN AUTO: 27.1 PG (ref 26.5–33)
MCHC RBC AUTO-ENTMCNC: 32.6 G/DL (ref 31.5–36.5)
MCV RBC AUTO: 83 FL (ref 78–100)
NONHDLC SERPL-MCNC: 159 MG/DL
PLATELET # BLD AUTO: 231 10E3/UL (ref 150–450)
POTASSIUM SERPL-SCNC: 4.1 MMOL/L (ref 3.4–5.3)
RBC # BLD AUTO: 4.98 10E6/UL (ref 3.8–5.2)
SODIUM SERPL-SCNC: 141 MMOL/L (ref 135–145)
TRIGL SERPL-MCNC: 115 MG/DL
TSH SERPL DL<=0.005 MIU/L-ACNC: 0.45 UIU/ML (ref 0.3–4.2)
VIT B12 SERPL-MCNC: 263 PG/ML (ref 232–1245)
VIT D+METAB SERPL-MCNC: 12 NG/ML (ref 20–50)
WBC # BLD AUTO: 6.5 10E3/UL (ref 4–11)

## 2025-03-04 PROCEDURE — 3080F DIAST BP >= 90 MM HG: CPT | Performed by: FAMILY MEDICINE

## 2025-03-04 PROCEDURE — 77063 BREAST TOMOSYNTHESIS BI: CPT | Mod: TC | Performed by: RADIOLOGY

## 2025-03-04 PROCEDURE — 91320 SARSCV2 VAC 30MCG TRS-SUC IM: CPT | Performed by: FAMILY MEDICINE

## 2025-03-04 PROCEDURE — 77067 SCR MAMMO BI INCL CAD: CPT | Mod: TC | Performed by: RADIOLOGY

## 2025-03-04 PROCEDURE — 99214 OFFICE O/P EST MOD 30 MIN: CPT | Mod: 25 | Performed by: FAMILY MEDICINE

## 2025-03-04 PROCEDURE — 99397 PER PM REEVAL EST PAT 65+ YR: CPT | Mod: 25 | Performed by: FAMILY MEDICINE

## 2025-03-04 PROCEDURE — 85027 COMPLETE CBC AUTOMATED: CPT | Performed by: FAMILY MEDICINE

## 2025-03-04 PROCEDURE — G2211 COMPLEX E/M VISIT ADD ON: HCPCS | Performed by: FAMILY MEDICINE

## 2025-03-04 PROCEDURE — 82607 VITAMIN B-12: CPT | Performed by: FAMILY MEDICINE

## 2025-03-04 PROCEDURE — 90662 IIV NO PRSV INCREASED AG IM: CPT | Performed by: FAMILY MEDICINE

## 2025-03-04 PROCEDURE — 86481 TB AG RESPONSE T-CELL SUSP: CPT | Performed by: FAMILY MEDICINE

## 2025-03-04 PROCEDURE — 90480 ADMN SARSCOV2 VAC 1/ONLY CMP: CPT | Performed by: FAMILY MEDICINE

## 2025-03-04 PROCEDURE — 3077F SYST BP >= 140 MM HG: CPT | Performed by: FAMILY MEDICINE

## 2025-03-04 PROCEDURE — 80061 LIPID PANEL: CPT | Performed by: FAMILY MEDICINE

## 2025-03-04 PROCEDURE — 84443 ASSAY THYROID STIM HORMONE: CPT | Performed by: FAMILY MEDICINE

## 2025-03-04 PROCEDURE — 82728 ASSAY OF FERRITIN: CPT | Performed by: FAMILY MEDICINE

## 2025-03-04 PROCEDURE — 90471 IMMUNIZATION ADMIN: CPT | Performed by: FAMILY MEDICINE

## 2025-03-04 PROCEDURE — 80048 BASIC METABOLIC PNL TOTAL CA: CPT | Performed by: FAMILY MEDICINE

## 2025-03-04 PROCEDURE — 36415 COLL VENOUS BLD VENIPUNCTURE: CPT | Performed by: FAMILY MEDICINE

## 2025-03-04 PROCEDURE — 82306 VITAMIN D 25 HYDROXY: CPT | Performed by: FAMILY MEDICINE

## 2025-03-04 RX ORDER — GABAPENTIN 100 MG/1
100 CAPSULE ORAL AT BEDTIME
Qty: 90 CAPSULE | Refills: 0 | Status: SHIPPED | OUTPATIENT
Start: 2025-03-04

## 2025-03-04 RX ORDER — LOSARTAN POTASSIUM 100 MG/1
100 TABLET ORAL DAILY
Qty: 90 TABLET | Refills: 3 | Status: SHIPPED | OUTPATIENT
Start: 2025-03-04

## 2025-03-04 RX ORDER — ATORVASTATIN CALCIUM 20 MG/1
20 TABLET, FILM COATED ORAL DAILY
Qty: 90 TABLET | Refills: 3 | Status: SHIPPED | OUTPATIENT
Start: 2025-03-04 | End: 2025-03-05

## 2025-03-04 SDOH — HEALTH STABILITY: PHYSICAL HEALTH: ON AVERAGE, HOW MANY DAYS PER WEEK DO YOU ENGAGE IN MODERATE TO STRENUOUS EXERCISE (LIKE A BRISK WALK)?: 4 DAYS

## 2025-03-04 SDOH — HEALTH STABILITY: PHYSICAL HEALTH: ON AVERAGE, HOW MANY MINUTES DO YOU ENGAGE IN EXERCISE AT THIS LEVEL?: 40 MIN

## 2025-03-04 ASSESSMENT — PATIENT HEALTH QUESTIONNAIRE - PHQ9
10. IF YOU CHECKED OFF ANY PROBLEMS, HOW DIFFICULT HAVE THESE PROBLEMS MADE IT FOR YOU TO DO YOUR WORK, TAKE CARE OF THINGS AT HOME, OR GET ALONG WITH OTHER PEOPLE: NOT DIFFICULT AT ALL
SUM OF ALL RESPONSES TO PHQ QUESTIONS 1-9: 3
SUM OF ALL RESPONSES TO PHQ QUESTIONS 1-9: 3

## 2025-03-04 ASSESSMENT — SOCIAL DETERMINANTS OF HEALTH (SDOH): HOW OFTEN DO YOU GET TOGETHER WITH FRIENDS OR RELATIVES?: THREE TIMES A WEEK

## 2025-03-04 NOTE — PROGRESS NOTES
"Preventive Care Visit  [facility]  Néstor Rendon MD, Family Medicine  Mar 4, 2025      Assessment & Plan     Routine general medical examination at a health care facility  Essential hypertension  Not controlled.  ? On meds  Renew ARB.  Follow up soon to recheck.  - BASIC METABOLIC PANEL  - Lipid panel reflex to direct LDL Non-fasting  - losartan (COZAAR) 100 MG tablet  Dispense: 90 tablet; Refill: 3  - atorvastatin (LIPITOR) 20 MG tablet  Dispense: 90 tablet; Refill: 3    Neuropathy  Will check on metabolic causes.  Discussed night time med to see if this helps  - TSH with free T4 reflex  - Vitamin B12  - gabapentin (NEURONTIN) 100 MG capsule  Dispense: 90 capsule; Refill: 0  - CBC with platelets  - Ferritin    Vitamin D deficiency  Recheck.  Consider DXA  - Vitamin D Deficiency    Screening examination for pulmonary tuberculosis  Screen as she is at [facility]  - Quantiferon TB Gold Plus    Screen for colon cancer  Discussed options.  She prefers stool test  - COLOGUARD([employer])    Visit for screening mammogram  Mammography today.  - MA Screening Bilateral w/ Vamsi      Patient has been advised of split billing requirements and indicates understanding: Yes        BMI  Estimated body mass index is 30.51 kg/m  as calculated from the following:    Height as of this encounter: 1.473 m (4' 10\").    Weight as of this encounter: 66.2 kg (146 lb).     Counseling  Appropriate preventive services were addressed with this patient via screening, questionnaire, or discussion as appropriate for fall prevention, nutrition, physical activity, Tobacco-use cessation, social engagement, weight loss and cognition.  Checklist reviewing preventive services available has been given to the patient.  Reviewed patient's diet, addressing concerns and/or questions.   The patient was instructed to see the dentist every 6 months.   The patient was provided with written information regarding signs of hearing " loss.     Subjective   Ma is a 69 year old, presenting for the following:  Physical        3/4/2025    10:54 AM   Additional Questions   Roomed by MADDY BAKER   Accompanied by adult  taker, sign language         3/4/2025   Forms   Any forms needing to be completed Yes         HPI    69-year-old female seen with  as noted above and with representative from adult  center.    Do not see her very often.  Visit 1 year ago.  Blood pressure not controlled.  Tried to ask about medications.  History was unclear as she was send said she both took them this morning and that they had all run out.  She does not know which medications she is taking.    Blood pressure very high today.  She has no symptoms of headache, chest pain, blurry vision, dizziness.    Does complain of burning tingling numb and crawling pain in bilateral lower legs.  No back pain.           3/4/2025   General Health   How would you rate your overall physical health? (!) POOR   Feel stress (tense, anxious, or unable to sleep) Only a little   (!) STRESS CONCERN      3/4/2025   Nutrition   Diet: I don't know         3/4/2025   Exercise   Days per week of moderate/strenous exercise 4 days   Average minutes spent exercising at this level 40 min         3/4/2025   Social Factors   Frequency of gathering with friends or relatives Three times a week   Worry food won't last until get money to buy more No   Food not last or not have enough money for food? No   Do you have housing? (Housing is defined as stable permanent housing and does not include staying ouside in a car, in a tent, in an abandoned building, in an overnight shelter, or couch-surfing.) Yes   Are you worried about losing your housing? No   Lack of transportation? No   Unable to get utilities (heat,electricity)? No         3/4/2025   Fall Risk   Fallen 2 or more times in the past year? No   Trouble with walking or balance? Yes   Gait Speed Test Interpretation Less than or equal to  5.00 seconds - PASS           3/4/2025   Activities of Daily Living- Home Safety   Needs help with the following daily activites None of the above   Safety concerns in the home None of the above         3/4/2025   Dental   Dentist two times every year? (!) NO         3/4/2025   Hearing Screening   Hearing concerns? (!) I NEED TO ASK PEOPLE TO SPEAK UP OR REPEAT THEMSELVES.    (!) TROUBLE UNDERSTANDING SOFT OR WHISPERED SPEECH.       Multiple values from one day are sorted in reverse-chronological order         3/4/2025   Driving Risk Screening   Patient/family members have concerns about driving (!) DECLINE         3/4/2025   General Alertness/Fatigue Screening   Have you been more tired than usual lately? No         3/4/2025   Urinary Incontinence Screening   Bothered by leaking urine in past 6 months No          Today's PHQ-9 Score:       3/4/2025    10:38 AM   PHQ-9 SCORE   PHQ-9 Total Score MyChart 3 (Minimal depression)   PHQ-9 Total Score 3        Patient-reported         3/4/2025   Substance Use   Alcohol more than 3/day or more than 7/wk No   Do you have a current opioid prescription? No   How severe/bad is pain from 1 to 10? 1/10   Do you use any other substances recreationally? No     Social History     Tobacco Use    Smoking status: Never     Passive exposure: Never    Smokeless tobacco: Never   Vaping Use    Vaping status: Never Used   Substance Use Topics    Alcohol use: No    Drug use: No          Mammogram Screening - Mammogram every 1-2 years updated in Health Maintenance based on mutual decision making      History of abnormal Pap smear: none known. No paps on record       ASCVD Risk   The 10-year ASCVD risk score (Toby DAVIS, et al., 2019) is: 26%    Values used to calculate the score:      Age: 69 years      Sex: Female      Is Non- : No      Diabetic: No      Tobacco smoker: No      Systolic Blood Pressure: 190 mmHg      Is BP treated: Yes      HDL Cholesterol: 42  mg/dL      Total Cholesterol: 172 mg/dL    Reviewed and updated as needed this visit by Provider       Med Hx  Surg Hx  Fam Hx            Past Medical History:   Diagnosis Date    Fibrocystic breast      History reviewed. No pertinent surgical history.  OB History    Para Term  AB Living   3 3 3 0 0 0   SAB IAB Ectopic Multiple Live Births   0 0 0 0 0      # Outcome Date GA Lbr Irving/2nd Weight Sex Type Anes PTL Lv   3 Term            2 Term            1 Term              BP Readings from Last 3 Encounters:   25 (!) 190/116   24 (!) 173/94   24 (!) 194/104    Wt Readings from Last 3 Encounters:   25 66.2 kg (146 lb)   24 64 kg (141 lb)   24 65 kg (143 lb 4.8 oz)                  Patient Active Problem List   Diagnosis    Vitamin D deficiency    Seasonal allergies    Deaf    Essential hypertension    Chronic gout of ankle, unspecified cause, unspecified laterality    Chronic pain of left knee    Incontinence    Acute pain of right shoulder     History reviewed. No pertinent surgical history.    Social History     Tobacco Use    Smoking status: Never     Passive exposure: Never    Smokeless tobacco: Never   Substance Use Topics    Alcohol use: No     Family History   Problem Relation Age of Onset    Breast Cancer No family hx of          Current Outpatient Medications   Medication Sig Dispense Refill    acetaminophen (TYLENOL) 500 MG tablet Take 2 tablets (1,000 mg) by mouth every 8 hours as needed for mild pain 100 tablet 2    atorvastatin (LIPITOR) 20 MG tablet Take 1 tablet (20 mg) by mouth daily. 90 tablet 3    gabapentin (NEURONTIN) 100 MG capsule Take 1 capsule (100 mg) by mouth at bedtime. 90 capsule 0    losartan (COZAAR) 100 MG tablet Take 1 tablet (100 mg) by mouth daily. 90 tablet 3    blood pressure test kit-medium Kit [BLOOD PRESSURE TEST KIT-MEDIUM KIT]       cetirizine (ZYRTEC) 10 MG tablet Take 1 tablet (10 mg) by mouth daily 30 tablet 5    Lidocaine  "(LIDOCARE) 4 % Patch Place 1 patch onto the skin every 24 hours To prevent lidocaine toxicity, patient should be patch free for 12 hrs daily. 5 patch 0    polyethylene glycol (MIRALAX) 17 GM/Dose powder Take 17 g (1 Capful) by mouth daily 578 g 1     No Known Allergies  Recent Labs   Lab Test 02/29/24  1155 01/07/22  1146 11/11/21  1310 12/03/18  0933   A1C 6.1*  --   --   --    *  --  103 113   HDL 42*  --  48* 45*   TRIG 112  --  126 142   ALT 21  --   --  20   CR 0.79  --  0.76 0.78   GFRESTIMATED 81  --  81 >60   GFRESTBLACK  --   --   --  >60   POTASSIUM 4.5  --  4.2 4.0   TSH 0.52 0.25* 0.16* 0.79           Current providers sharing in care for this patient include:  Patient Care Team:  Néstor Rendon MD as PCP - General (Family Medicine)  Alexandra Ortiz as Lead Care Coordinator (Primary Care - CC)  Néstor Rendon MD as Assigned PCP  Shanon Erickson MD as Assigned Musculoskeletal Provider    The following health maintenance items are reviewed in Epic and correct as of today:  Health Maintenance   Topic Date Due    DEXA  Never done    COLORECTAL CANCER SCREENING  12/10/2021    BMP  02/28/2025    LIPID  02/28/2025    ZOSTER IMMUNIZATION (2 of 2) 04/25/2024    PHQ-9  09/04/2025    COVID-19 Vaccine (5 - 2024-25 season) 09/04/2025    MEDICARE ANNUAL WELLNESS VISIT  03/04/2026    ANNUAL REVIEW OF HM ORDERS  03/04/2026    FALL RISK ASSESSMENT  03/04/2026    DTAP/TDAP/TD IMMUNIZATION (3 - Td or Tdap) 01/31/2027    GLUCOSE  02/28/2027    MAMMO SCREENING  03/04/2027    ADVANCE CARE PLANNING  02/28/2029    HEPATITIS C SCREENING  Completed    INFLUENZA VACCINE  Completed    Pneumococcal Vaccine: 50+ Years  Completed    RSV VACCINE  Completed    HPV IMMUNIZATION  Aged Out    MENINGITIS IMMUNIZATION  Aged Out        Objective    Exam  BP (!) 190/116 (BP Location: Right arm, Patient Position: Sitting, Cuff Size: Adult Regular)   Pulse 85   Temp 98.9  F (37.2  C) (Oral)   Resp 18   Ht 1.473 m (4' 10\")   Wt 66.2 kg " "(146 lb)   LMP  (LMP Unknown)   BMI 30.51 kg/m     Estimated body mass index is 30.51 kg/m  as calculated from the following:    Height as of this encounter: 1.473 m (4' 10\").    Weight as of this encounter: 66.2 kg (146 lb).    Physical Exam  GENERAL: alert, not distressed  EYES: PERRL/EOMI, no scleral icterus, no conjunctival injection  EARS: normal tympanic membranes and external auditory canals bilaterally  PHARYNX: no erythema or exudates  MOUTH: well hydrated mucosa, no lesions  NECK: no lymphadenopathy or thyroid nodules  CHEST: clear, no rales, rhonchi, or wheezes  CARDIAC: regular without murmur, gallop, or rub  ABDOMEN: soft, non tender, non distended, normal bowel sounds          3/4/2025   Mini Cog   Mini-Cog Not Completed (choose reason) Language barrier and no  present         Language barrier makes Mini Cog inaccurate.  She does not have concerns about her memory.      Signed Electronically by: Néstor Rendon MD    Answers submitted by the patient for this visit:  Patient Health Questionnaire (Submitted on 3/4/2025)  If you checked off any problems, how difficult have these problems made it for you to do your work, take care of things at home, or get along with other people?: Not difficult at all  PHQ9 TOTAL SCORE: 3    "

## 2025-03-04 NOTE — LETTER
March 5, 2025      Sivakumar Ernandez  [address]  [place] [address]        Dear ,    We are writing to inform you of your test results.    We usha to see you again in a few weeks to recheck your blood pressure and adjust medicines.  Please make sure you are taking cholesterol medicine (atorvastatin).  Vitamin D is low.  I sent a prescription for a supplement.    Resulted Orders   BASIC METABOLIC PANEL   Result Value Ref Range    Sodium 141 135 - 145 mmol/L    Potassium 4.1 3.4 - 5.3 mmol/L    Chloride 103 98 - 107 mmol/L    Carbon Dioxide (CO2) 28 22 - 29 mmol/L    Anion Gap 10 7 - 15 mmol/L    Urea Nitrogen 13.3 8.0 - 23.0 mg/dL    Creatinine 0.81 0.51 - 0.95 mg/dL    GFR Estimate 78 >60 mL/min/1.73m2      Comment:      eGFR calculated using 2021 CKD-EPI equation.    Calcium 9.8 8.8 - 10.4 mg/dL    Glucose 95 70 - 99 mg/dL    Patient Fasting > 8hrs? No    Lipid panel reflex to direct LDL Non-fasting   Result Value Ref Range    Cholesterol 210 (H) <200 mg/dL    Triglycerides 115 <150 mg/dL    Direct Measure HDL 51 >=50 mg/dL    LDL Cholesterol Calculated 136 (H) <100 mg/dL    Non HDL Cholesterol 159 (H) <130 mg/dL    Patient Fasting > 8hrs? No     Narrative    Cholesterol  Desirable: < 200 mg/dL  Borderline High: 200 - 239 mg/dL  High: >= 240 mg/dL    Triglycerides  Normal: < 150 mg/dL  Borderline High: 150 - 199 mg/dL  High: 200-499 mg/dL  Very High: >= 500 mg/dL    Direct Measure HDL  Female: >= 50 mg/dL   Male: >= 40 mg/dL    LDL Cholesterol  Desirable: < 100 mg/dL  Above Desirable: 100 - 129 mg/dL   Borderline High: 130 - 159 mg/dL   High:  160 - 189 mg/dL   Very High: >= 190 mg/dL    Non HDL Cholesterol  Desirable: < 130 mg/dL  Above Desirable: 130 - 159 mg/dL  Borderline High: 160 - 189 mg/dL  High: 190 - 219 mg/dL  Very High: >= 220 mg/dL   TSH with free T4 reflex   Result Value Ref Range    TSH 0.45 0.30 - 4.20 uIU/mL   Vitamin B12   Result Value Ref Range    Vitamin B12 263 232 - 1,245 pg/mL    CBC with platelets   Result Value Ref Range    WBC Count 6.5 4.0 - 11.0 10e3/uL    RBC Count 4.98 3.80 - 5.20 10e6/uL    Hemoglobin 13.5 11.7 - 15.7 g/dL    Hematocrit 41.4 35.0 - 47.0 %    MCV 83 78 - 100 fL    MCH 27.1 26.5 - 33.0 pg    MCHC 32.6 31.5 - 36.5 g/dL    RDW 13.3 10.0 - 15.0 %    Platelet Count 231 150 - 450 10e3/uL   Ferritin   Result Value Ref Range    Ferritin 90 11 - 328 ng/mL   Vitamin D Deficiency   Result Value Ref Range    Vitamin D, Total (25-Hydroxy) 12 (L) 20 - 50 ng/mL      Comment:      mild to moderate deficiency    Narrative    Season, race, dietary intake, and treatment affect the concentration of 25-hydroxy-Vitamin D. Values may decrease during winter months and increase during summer months.    Vitamin D determination is routinely performed by an immunoassay specific for 25 hydroxyvitamin D3.  If an individual is on vitamin D2(ergocalciferol) supplementation, please specify 25 OH vitamin D2 and D3 level determination by LCMSMS test [id number].         If you have any questions or concerns, please call the clinic at the number listed above.       Sincerely,      Néstor Rendon MD    Electronically signed

## 2025-03-04 NOTE — PATIENT INSTRUCTIONS
Patient Education   Preventive Care Advice   This is general advice given by our system to help you stay healthy. However, your care team may have specific advice just for you. Please talk to your care team about your preventive care needs.  Nutrition  Eat 5 or more servings of fruits and vegetables each day.  Try wheat bread, brown rice and whole grain pasta (instead of white bread, rice, and pasta).  Get enough calcium and vitamin D. Check the label on foods and aim for 100% of the RDA (recommended daily allowance).  Lifestyle  Exercise at least 150 minutes each week  (30 minutes a day, 5 days a week).  Do muscle strengthening activities 2 days a week. These help control your weight and prevent disease.  No smoking.  Wear sunscreen to prevent skin cancer.  Have a dental exam and cleaning every 6 months.  Yearly exams  See your health care team every year to talk about:  Any changes in your health.  Any medicines your care team has prescribed.  Preventive care, family planning, and ways to prevent chronic diseases.  Shots (vaccines)   HPV shots (up to age 26), if you've never had them before.  Hepatitis B shots (up to age 59), if you've never had them before.  COVID-19 shot: Get this shot when it's due.  Flu shot: Get a flu shot every year.  Tetanus shot: Get a tetanus shot every 10 years.  Pneumococcal, hepatitis A, and RSV shots: Ask your care team if you need these based on your risk.  Shingles shot (for age 50 and up)  General health tests  Diabetes screening:  Starting at age 35, Get screened for diabetes at least every 3 years.  If you are younger than age 35, ask your care team if you should be screened for diabetes.  Cholesterol test: At age 39, start having a cholesterol test every 5 years, or more often if advised.  Bone density scan (DEXA): At age 50, ask your care team if you should have this scan for osteoporosis (brittle bones).  Hepatitis C: Get tested at least once in your life.  STIs (sexually  transmitted infections)  Before age 24: Ask your care team if you should be screened for STIs.  After age 24: Get screened for STIs if you're at risk. You are at risk for STIs (including HIV) if:  You are sexually active with more than one person.  You don't use condoms every time.  You or a partner was diagnosed with a sexually transmitted infection.  If you are at risk for HIV, ask about PrEP medicine to prevent HIV.  Get tested for HIV at least once in your life, whether you are at risk for HIV or not.  Cancer screening tests  Cervical cancer screening: If you have a cervix, begin getting regular cervical cancer screening tests starting at age 21.  Breast cancer scan (mammogram): If you've ever had breasts, begin having regular mammograms starting at age 40. This is a scan to check for breast cancer.  Colon cancer screening: It is important to start screening for colon cancer at age 45.  Have a colonoscopy test every 10 years (or more often if you're at risk) Or, ask your provider about stool tests like a FIT test every year or Cologuard test every 3 years.  To learn more about your testing options, visit:   .  For help making a decision, visit:   https://bit.ly/sn37830.  Prostate cancer screening test: If you have a prostate, ask your care team if a prostate cancer screening test (PSA) at age 55 is right for you.  Lung cancer screening: If you are a current or former smoker ages 50 to 80, ask your care team if ongoing lung cancer screenings are right for you.  For informational purposes only. Not to replace the advice of your health care provider. Copyright   2023 [facility]. All rights reserved. Clinically reviewed by the [facility] Transitions Program. idiag [id number] - REV 01/24.  Preventing Falls: Care Instructions  Injuries and health problems such as trouble walking or poor eyesight can increase your risk of falling. So can some medicines. But there are things you can do to help  "prevent falls. You can exercise to get stronger. You can also arrange your home to make it safer.    Talk to your doctor about the medicines you take. Ask if any of them increase the risk of falls and whether they can be changed or stopped.   Try to exercise regularly. It can help improve your strength and balance. This can help lower your risk of falling.         Practice fall safety and prevention.   Wear low-heeled shoes that fit well and give your feet good support. Talk to your doctor if you have foot problems that make this hard.  Carry a cellphone or wear a medical alert device that you can use to call for help.  Use stepladders instead of chairs to reach high objects. Don't climb if you're at risk for falls. Ask for help, if needed.  Wear the correct eyeglasses, if you need them.        Make your home safer.   Remove rugs, cords, clutter, and furniture from walkways.  Keep your house well lit. Use night-lights in hallways and bathrooms.  Install and use sturdy handrails on stairways.  Wear nonskid footwear, even inside. Don't walk barefoot or in socks without shoes.        Be safe outside.   Use handrails, curb cuts, and ramps whenever possible.  Keep your hands free by using a shoulder bag or backpack.  Try to walk in well-lit areas. Watch out for uneven ground, changes in pavement, and debris.  Be careful in the winter. Walk on the grass or gravel when sidewalks are slippery. Use de-icer on steps and walkways. Add non-slip devices to shoes.    Put grab bars and nonskid mats in your shower or tub and near the toilet. Try to use a shower chair or bath bench when bathing.   Get into a tub or shower by putting in your weaker leg first. Get out with your strong side first. Have a phone or medical alert device in the bathroom with you.   Where can you learn more?  Go to https://www.Telerikwise.net/patiented  Enter G117 in the search box to learn more about \"Preventing Falls: Care Instructions.\"  Current as of: " July 31, 2024  Content Version: 14.3    2024 [employer].   Care instructions adapted under license by your healthcare professional. If you have questions about a medical condition or this instruction, always ask your healthcare professional. [employer] disclaims any warranty or liability for your use of this information.    Hearing Loss: Care Instructions  Overview     Hearing loss is a sudden or slow decrease in how well you hear. It can range from slight to profound. Permanent hearing loss can occur with aging. It also can happen when you are exposed long-term to loud noise. Examples include listening to loud music, riding motorcycles, or being around other loud machines.  Hearing loss can affect your work and home life. It can make you feel lonely or depressed. You may feel that you have lost your independence. But hearing aids and other devices can help you hear better and feel connected to others.  Follow-up care is a key part of your treatment and safety. Be sure to make and go to all appointments, and call your doctor if you are having problems. It's also a good idea to know your test results and keep a list of the medicines you take.  How can you care for yourself at home?  Avoid loud noises whenever possible. This helps keep your hearing from getting worse.  Always wear hearing protection around loud noises.  Wear a hearing aid as directed.  A professional can help you pick a hearing aid that will work best for you.  You can also get hearing aids over the counter for mild to moderate hearing loss.  Have hearing tests as your doctor suggests. They can show whether your hearing has changed. Your hearing aid may need to be adjusted.  Use other devices as needed. These may include:  Telephone amplifiers and hearing aids that can connect to a television, stereo, radio, or microphone.  Devices that use lights or vibrations. These alert you to the doorbell, a ringing telephone, or a baby  "monitor.  Television closed-captioning. This shows the words at the bottom of the screen. Most new TVs can do this.  TTY (text telephone). This lets you type messages back and forth on the telephone instead of talking or listening. These devices are also called TDD. When messages are typed on the keyboard, they are sent over the phone line to a receiving TTY. The message is shown on a monitor.  Use text messaging, social media, and email if it is hard for you to communicate by telephone.  Try to learn a listening technique called speechreading. It is not lipreading. You pay attention to people's gestures, expressions, posture, and tone of voice. These clues can help you understand what a person is saying. Face the person you are talking to, and have them face you. Make sure the lighting is good. You need to see the other person's face clearly.  Think about counseling if you need help to adjust to your hearing loss.  When should you call for help?  Watch closely for changes in your health, and be sure to contact your doctor if:    You think your hearing is getting worse.     You have new symptoms, such as dizziness or nausea.   Where can you learn more?  Go to https://www.[employer].net/patiented  Enter R798 in the search box to learn more about \"Hearing Loss: Care Instructions.\"  Current as of: September 27, 2023  Content Version: 14.3    2024 [employer].   Care instructions adapted under license by your healthcare professional. If you have questions about a medical condition or this instruction, always ask your healthcare professional. [employer] disclaims any warranty or liability for your use of this information.       "

## 2025-03-05 ENCOUNTER — TELEPHONE (OUTPATIENT)
Dept: FAMILY MEDICINE | Facility: CLINIC | Age: 70
End: 2025-03-05
Payer: COMMERCIAL

## 2025-03-05 DIAGNOSIS — E55.9 VITAMIN D DEFICIENCY: ICD-10-CM

## 2025-03-05 DIAGNOSIS — I10 ESSENTIAL HYPERTENSION: ICD-10-CM

## 2025-03-05 LAB
GAMMA INTERFERON BACKGROUND BLD IA-ACNC: 0.04 IU/ML
M TB IFN-G BLD-IMP: POSITIVE
M TB IFN-G CD4+ BCKGRND COR BLD-ACNC: 9.96 IU/ML
MITOGEN IGNF BCKGRD COR BLD-ACNC: 0.36 IU/ML
MITOGEN IGNF BCKGRD COR BLD-ACNC: 0.42 IU/ML
QUANTIFERON MITOGEN: 10 IU/ML
QUANTIFERON NIL TUBE: 0.04 IU/ML
QUANTIFERON TB1 TUBE: 0.4 IU/ML
QUANTIFERON TB2 TUBE: 0.46

## 2025-03-05 RX ORDER — CHOLECALCIFEROL (VITAMIN D3) 50 MCG
1 TABLET ORAL DAILY
Qty: 90 TABLET | Refills: 1 | Status: SHIPPED | OUTPATIENT
Start: 2025-03-05 | End: 2025-03-05

## 2025-03-05 RX ORDER — ATORVASTATIN CALCIUM 20 MG/1
20 TABLET, FILM COATED ORAL DAILY
Qty: 90 TABLET | Refills: 3 | Status: SHIPPED | OUTPATIENT
Start: 2025-03-05

## 2025-03-05 RX ORDER — CHOLECALCIFEROL (VITAMIN D3) 50 MCG
1 TABLET ORAL DAILY
Qty: 90 TABLET | Refills: 1 | Status: SHIPPED | OUTPATIENT
Start: 2025-03-05

## 2025-03-05 NOTE — TELEPHONE ENCOUNTER
Spoke with daughter Amber (CTC on file) and relayed reults and plan of care. Scheduled for follow up on 4/3/25 with PCP. Medications sent to alternate pharmacy and preferred pharmacy list updated.    BAN Ibarra BSN, PHN, [facility] (she/her)  [facility] RN    ----- Message from Néstor Rendon sent at 3/5/2025 11:08 AM CST -----  Call:  We usha to see you again in a few weeks to recheck your blood pressure and adjust medicines.  Please make sure you are taking cholesterol medicine (atorvastatin).  Vitamin D is low.  I sent a prescription for a supplement.

## 2025-03-05 NOTE — TELEPHONE ENCOUNTER
[facility] referral from: [facility] visit (referral by provider)    MT referral outreach attempt #1 on March 5, 2025 at 11:09 AM      Outcome: Spoke with patient 's daughter and she will talk to her mom and give us a call back.     Use chen black for the carrier/Plan on the flowsheet          See Óscar  [facility]   [phone]

## 2025-03-05 NOTE — PROGRESS NOTES
Future Appointments 3/5/2025 - 9/1/2025        Date Visit Type Length Department Provider     4/3/2025  3:20 PM OFFICE VISIT 20 min [facility] Néstor Rendon MD    Location Instructions:     [facility] is located at [address] [place] [address] in [place], at the intersection of [address]. This is one block south of the [facility]. Free parking is available in the lot directly north of the clinic across [address]. The clinic is near stops along bus [address] and [address].

## 2025-03-10 ENCOUNTER — TELEPHONE (OUTPATIENT)
Dept: FAMILY MEDICINE | Facility: CLINIC | Age: 70
End: 2025-03-10
Payer: COMMERCIAL

## 2025-03-10 NOTE — LETTER
March 12, 2025      Sivakumar Ernandez  [address]  [place] [address]        Dear MINDY Shaver [facility] has tried to reach you by phone. Your Tuberculosis screening test was positive.  We need to have a visit to discuss this and get chest xray.  Please call [facility] at [phone] to schedule a sooner appointment. Thank you.      Sincerely,      Néstor Rendon MD and [facility] RN

## 2025-03-10 NOTE — TELEPHONE ENCOUNTER
Left voicemail requesting patient/daughter return call. Please relay clinician message should patient call back.    Note: Phone number on file belongs to daughterLuis in chart.    Arpita Lin RN  [facility]

## 2025-03-10 NOTE — TELEPHONE ENCOUNTER
----- Message from Néstor Rendon sent at 3/9/2025  9:40 PM CDT -----  Call:  Tuberculosis screening test was positive.  We need to have a visit to discuss this and get chest xray.

## 2025-03-11 NOTE — TELEPHONE ENCOUNTER
RN attempted to contact patient, but no answer. Left message with patient's to call clinic back.  Need to schedule sooner appointment. Patient currently scheduled on 4/3/25    If patient / family calls back, please help schedule sooner appointment. Ok to use sameday or acute appointment    Nicolle Fritz RN  [facility]    ----- Message from Néstor Rendon sent at 3/9/2025  9:40 PM CDT -----  Call:  Tuberculosis screening test was positive.  We need to have a visit to discuss this and get chest xray.

## 2025-03-12 ENCOUNTER — APPOINTMENT (OUTPATIENT)
Dept: INTERPRETER SERVICES | Facility: CLINIC | Age: 70
End: 2025-03-12
Payer: COMMERCIAL

## 2025-03-12 NOTE — TELEPHONE ENCOUNTER
RN made 3rd attempt to contact patient, / family but no answer. Left message with patient's to call clinic back.  Need to schedule sooner appointment. Patient currently scheduled on 4/3/25  Per protocol, letter sent  Will keep in basket for + TB screen    If patient / family calls back, please help schedule sooner appointment. Ok to use sameday or acute appointment    Nicolle Fritz RN  [facility]    ----- Message from Néstor Rendon sent at 3/9/2025  9:40 PM CDT -----  Call:  Tuberculosis screening test was positive.  We need to have a visit to discuss this and get chest xray.

## 2025-03-14 NOTE — TELEPHONE ENCOUNTER
Fourth attempt: left voicemail for daughterAmber requesting return call. No other contacts in chart or consent to communicate form to call. Result letter sent by previous RN.      FYI to PCP.

## 2025-03-20 ENCOUNTER — PATIENT OUTREACH (OUTPATIENT)
Dept: GERIATRIC MEDICINE | Facility: CLINIC | Age: 70
End: 2025-03-20
Payer: COMMERCIAL

## 2025-03-20 LAB — NONINV COLON CA DNA+OCC BLD SCRN STL QL: NORMAL

## 2025-03-20 NOTE — TELEPHONE ENCOUNTER
Spoke to pt's daughter Amber Ernandez, relayed clinician's message and recommendation in Hmong. Appt scheduled for 3/31/25 w/ Dr Rendon.     Sylvia BERMUDEZ RN  [facility]

## 2025-03-20 NOTE — TELEPHONE ENCOUNTER
We should try to contact her at adult senior center.  There are scanned forms from the adult [facility] center in the chart.

## 2025-03-20 NOTE — LETTER
March 20, 2025       MADDY ANDRE  [address]  [place] [address]      Dear Ma,    At [facility], we re dedicated to improving your health and wellness. Enclosed is the Support Plan developed with you on 2/21/2025. Please review the Support Plan carefully.    As a reminder, during your visit we talked about:   Ways to manage your physical and mental health   Using health care to maintain and improve your health    Your preventive care needs      Remember to contact your care coordinator if you:   Are hospitalized or plan to be hospitalized    Have a fall     Have a change in your physical or mental health   Need help finding support or services    If you have questions or don t agree with your Support Plan, call me at [phone]. You can also call me if your needs change. TTY users call the [place] [employer] at 696 or 1-931.684.4840 (gtbvwy-uc-xudqpy relay service).    Sincerely,       DHRUV Hidalgo  [phone]  [email or web address]                [id number] accepted     (06/2024)                [address] [place] [address]  [phone]  fax [phone]  [facility]

## 2025-03-20 NOTE — PROGRESS NOTES
[facility] Care Coordination Contact    Received after visit chart from care coordinator. Completed following tasks: Mailed copy of support plan to member, Mailed [place] Cho[place] signature sheet pages 3-4, Mailed Safe Medication Disposal, and Mailed Transition of Care Member Handout.    Stephanie Sosa  Care Management Specialist  [facility]  [phone]

## 2025-03-31 ENCOUNTER — OFFICE VISIT (OUTPATIENT)
Dept: FAMILY MEDICINE | Facility: CLINIC | Age: 70
End: 2025-03-31
Payer: COMMERCIAL

## 2025-03-31 ENCOUNTER — ANCILLARY PROCEDURE (OUTPATIENT)
Dept: GENERAL RADIOLOGY | Facility: CLINIC | Age: 70
End: 2025-03-31
Attending: FAMILY MEDICINE
Payer: COMMERCIAL

## 2025-03-31 VITALS
OXYGEN SATURATION: 98 % | HEIGHT: 57 IN | HEART RATE: 69 BPM | RESPIRATION RATE: 18 BRPM | SYSTOLIC BLOOD PRESSURE: 162 MMHG | BODY MASS INDEX: 30.42 KG/M2 | DIASTOLIC BLOOD PRESSURE: 80 MMHG | TEMPERATURE: 97.7 F | WEIGHT: 141 LBS

## 2025-03-31 DIAGNOSIS — Z22.7 LATENT TUBERCULOSIS BY BLOOD TEST: ICD-10-CM

## 2025-03-31 DIAGNOSIS — Z22.7 LATENT TUBERCULOSIS BY BLOOD TEST: Primary | ICD-10-CM

## 2025-03-31 DIAGNOSIS — I10 ESSENTIAL HYPERTENSION: ICD-10-CM

## 2025-03-31 PROCEDURE — T1013 SIGN LANG/ORAL INTERPRETER: HCPCS | Mod: U3

## 2025-03-31 PROCEDURE — 3077F SYST BP >= 140 MM HG: CPT | Performed by: FAMILY MEDICINE

## 2025-03-31 PROCEDURE — 3078F DIAST BP <80 MM HG: CPT | Performed by: FAMILY MEDICINE

## 2025-03-31 PROCEDURE — G2211 COMPLEX E/M VISIT ADD ON: HCPCS | Performed by: FAMILY MEDICINE

## 2025-03-31 PROCEDURE — 99214 OFFICE O/P EST MOD 30 MIN: CPT | Performed by: FAMILY MEDICINE

## 2025-03-31 PROCEDURE — 71046 X-RAY EXAM CHEST 2 VIEWS: CPT | Mod: TC | Performed by: RADIOLOGY

## 2025-03-31 RX ORDER — AMLODIPINE BESYLATE 5 MG/1
5 TABLET ORAL DAILY
Qty: 90 TABLET | Refills: 1 | Status: SHIPPED | OUTPATIENT
Start: 2025-03-31

## 2025-03-31 NOTE — PROGRESS NOTES
Prior to immunization administration, verified patients identity using patient s name and date of birth. Please see Immunization Activity for additional information.     Screening Questionnaire for Adult Immunization    Are you sick today?   No   Do you have allergies to medications, food, a vaccine component or latex?   No   Have you ever had a serious reaction after receiving a vaccination?   No   Do you have a long-term health problem with heart, lung, kidney, or metabolic disease (e.g., diabetes), asthma, a blood disorder, no spleen, complement component deficiency, a cochlear implant, or a spinal fluid leak?  Are you on long-term aspirin therapy?   No   Do you have cancer, leukemia, HIV/AIDS, or any other immune system problem?   No   Do you have a parent, brother, or sister with an immune system problem?   No   In the past 3 months, have you taken medications that affect  your immune system, such as prednisone, other steroids, or anticancer drugs; drugs for the treatment of rheumatoid arthritis, Crohn s disease, or psoriasis; or have you had radiation treatments?   No   Have you had a seizure, or a brain or other nervous system problem?   No   During the past year, have you received a transfusion of blood or blood    products, or been given immune (gamma) globulin or antiviral drug?   No   For women: Are you pregnant or is there a chance you could become       pregnant during the next month?   No   Have you received any vaccinations in the past 4 weeks?   No     Immunization questionnaire answers were all negative.      Patient instructed to remain in clinic for 15 minutes afterwards, and to report any adverse reactions.     Screening performed by Arturo Gutiérrez MA on 3/31/2025 at 3:05 PM.       Answers submitted by the patient for this visit:  General Questionnaire (Submitted on 3/31/2025)  Chief Complaint: Chronic problems general questions HPI Form  What is the reason for your visit today? : follow  up  How many servings of fruits and vegetables do you eat daily?: 2-3  On average, how many sweetened beverages do you drink each day (Examples: soda, juice, sweet tea, etc.  Do NOT count diet or artificially sweetened beverages)?: 0  How many minutes a day do you exercise enough to make your heart beat faster?: 20 to 29  How many days a week do you exercise enough to make your heart beat faster?: 4  How many days per week do you miss taking your medication?: 7  Questionnaire about: Chronic problems general questions HPI Form (Submitted on 3/31/2025)  Chief Complaint: Chronic problems general questions HPI Form

## 2025-03-31 NOTE — PROGRESS NOTES
"  Assessment & Plan     Latent tuberculosis by blood test  Discussed distinction between active and latent tuberculosis.  Recommended treatment in either case.  Chest x-ray today.  Presume this will be normal and we will treat for latent tuberculosis.  Options discussed.  Patient opted for 4 months rifampin.  - XR Chest 2 Views    Essential hypertension  Not yet controlled.  Will add amlodipine.  Follow-up April 24 as noted below.  - amLODIPine (NORVASC) 5 MG tablet  Dispense: 90 tablet; Refill: 1    Future Appointments   Date Time Provider Department Center   3/31/2025  3:20 PM Néstor Rendon MD [facility]   4/24/2025  9:40 AM [facility]          BMI  Estimated body mass index is 30.51 kg/m  as calculated from the following:    Height as of this encounter: 1.448 m (4' 9\").    Weight as of this encounter: 64 kg (141 lb).       Subjective   Ma is a 69 year old, presenting for the following health issues:  Follow Up (F/  U TB )        3/31/2025     3:03 PM   Additional Questions   Roomed by HSER   Accompanied by DAUGHTER     History of Present Illness       Reason for visit:  Follow up    She eats 2-3 servings of fruits and vegetables daily.She consumes 0 sweetened beverage(s) daily.She exercises with enough effort to increase her heart rate 20 to 29 minutes per day.  She exercises with enough effort to increase her heart rate 4 days per week. She is missing 7 dose(s) of medications per week.      69-year-old female here for follow-up on blood pressure.  Tolerating blood pressure medications upon restart without significant issue.  Still having blood pressures too high.    Also, here for follow-up on tuberculosis blood test which was positive.  Does not have symptoms of weight loss, coughing, night sweats, fatigue, hemoptysis.    She is unsure of her previous tuberculosis history.  Does not have known history of treatment.        Objective    BP (!) 162/80 (BP Location: Right arm, " "Patient Position: Sitting, Cuff Size: Adult Regular)   Pulse 69   Temp 97.7  F (36.5  C) (Temporal)   Resp 18   Ht 1.448 m (4' 9\")   Wt 64 kg (141 lb)   LMP  (LMP Unknown)   SpO2 98%   BMI 30.51 kg/m    Body mass index is 30.51 kg/m .  Physical Exam   GENERAL: alert, not distressed  CHEST: clear, no rales, rhonchi, or wheezes  CARDIAC: regular without murmur, gallop, or rub  ABDOMEN: soft, non tender, non distended, normal bowel sounds          Signed Electronically by: Néstor Rendon MD    "

## 2025-04-02 RX ORDER — RIFAMPIN 300 MG/1
600 CAPSULE ORAL DAILY
Qty: 240 CAPSULE | Refills: 0 | Status: SHIPPED | OUTPATIENT
Start: 2025-04-02 | End: 2025-07-31

## 2025-04-24 ENCOUNTER — OFFICE VISIT (OUTPATIENT)
Dept: FAMILY MEDICINE | Facility: CLINIC | Age: 70
End: 2025-04-24
Payer: COMMERCIAL

## 2025-04-24 ENCOUNTER — ORDERS ONLY (AUTO-RELEASED) (OUTPATIENT)
Dept: FAMILY MEDICINE | Facility: CLINIC | Age: 70
End: 2025-04-24

## 2025-04-24 VITALS
HEART RATE: 70 BPM | SYSTOLIC BLOOD PRESSURE: 151 MMHG | RESPIRATION RATE: 15 BRPM | BODY MASS INDEX: 29.18 KG/M2 | WEIGHT: 139 LBS | HEIGHT: 58 IN | DIASTOLIC BLOOD PRESSURE: 93 MMHG | OXYGEN SATURATION: 98 % | TEMPERATURE: 98.1 F

## 2025-04-24 DIAGNOSIS — Z12.11 SCREEN FOR COLON CANCER: ICD-10-CM

## 2025-04-24 DIAGNOSIS — I10 ESSENTIAL HYPERTENSION: Primary | ICD-10-CM

## 2025-04-24 NOTE — PROGRESS NOTES
Prior to immunization administration, verified patients identity using patient s name and date of birth. Please see Immunization Activity for additional information.     Screening Questionnaire for Adult Immunization    Are you sick today?   No   Do you have allergies to medications, food, a vaccine component or latex?   No   Have you ever had a serious reaction after receiving a vaccination?   No   Do you have a long-term health problem with heart, lung, kidney, or metabolic disease (e.g., diabetes), asthma, a blood disorder, no spleen, complement component deficiency, a cochlear implant, or a spinal fluid leak?  Are you on long-term aspirin therapy?   No   Do you have cancer, leukemia, HIV/AIDS, or any other immune system problem?   No   Do you have a parent, brother, or sister with an immune system problem?   No   In the past 3 months, have you taken medications that affect  your immune system, such as prednisone, other steroids, or anticancer drugs; drugs for the treatment of rheumatoid arthritis, Crohn s disease, or psoriasis; or have you had radiation treatments?   No   Have you had a seizure, or a brain or other nervous system problem?   No   During the past year, have you received a transfusion of blood or blood    products, or been given immune (gamma) globulin or antiviral drug?   No   For women: Are you pregnant or is there a chance you could become       pregnant during the next month?   No   Have you received any vaccinations in the past 4 weeks?   No     Immunization questionnaire answers were all negative.      Patient instructed to remain in clinic for 15 minutes afterwards, and to report any adverse reactions.     Screening performed by Arturo Gutiérrez MA on 4/24/2025 at 9:39 AM.       Answers submitted by the patient for this visit:  Hypertension Visit (Submitted on 4/24/2025)  Chief Complaint: Chronic problems general questions HPI Form  Do you check your blood pressure regularly outside of the  clinic?: Yes  Are your blood pressures ever more than 140 on the top number (systolic) OR more than 90 on the bottom number (diastolic)? (For example, greater than 140/90): Yes  Are you following a low salt diet?: Yes  General Questionnaire (Submitted on 4/24/2025)  Chief Complaint: Chronic problems general questions HPI Form  What is the reason for your visit today? : follow up  How many servings of fruits and vegetables do you eat daily?: 2-3  On average, how many sweetened beverages do you drink each day (Examples: soda, juice, sweet tea, etc.  Do NOT count diet or artificially sweetened beverages)?: 0  How many minutes a day do you exercise enough to make your heart beat faster?: 9 or less  How many days a week do you exercise enough to make your heart beat faster?: 3 or less  How many days per week do you miss taking your medication?: 7  Questionnaire about: Chronic problems general questions HPI Form (Submitted on 4/24/2025)  Chief Complaint: Chronic problems general questions HPI Form

## 2025-04-24 NOTE — PROGRESS NOTES
"  Assessment & Plan     Essential hypertension  Recommended adding losartan Pelzel back to current amlodipine.  Will contact her in a couple weeks for blood pressure readings.  New description for a new home monitor.  - Home Blood Pressure Monitor Order    Screen for colon cancer  Discussed submitting a sample.  - MATTHIAS([employer])      BMI    Subjective   Ma is a 69 year old, presenting for the following health issues:  Hypertension (Bp check )        4/24/2025     9:35 AM   Additional Questions   Roomed by meagan     History of Present Illness       Hypertension: She presents for follow up of hypertension.  She does check blood pressure  regularly outside of the clinic. Outside blood pressures have been over 140/90. She follows a low salt diet.     Reason for visit:  Follow up    She eats 2-3 servings of fruits and vegetables daily.She consumes 0 sweetened beverage(s) daily.She exercises with enough effort to increase her heart rate 9 or less minutes per day.  She exercises with enough effort to increase her heart rate 3 or less days per week. She is missing 7 dose(s) of medications per week.      Seen to follow-up on blood pressure.  Started taking amlodipine.  Stopped taking losartan.  Feels fine.  Unfortunately pressure still high.  Did not understand she was to take both.    Taking rifampin.  No problems with that.  No side effects.    Colon cancer stool test was submitted with no stool in the container.  Discussed again methods of screening for colon cancer.  She adamantly does not want to have a colonoscopy.      Objective    BP (!) 151/93   Pulse 70   Temp 98.1  F (36.7  C) (Temporal)   Resp 15   Ht 1.47 m (4' 9.87\")   Wt 63 kg (139 lb)   LMP  (LMP Unknown)   SpO2 98%   BMI 29.18 kg/m    Body mass index is 29.18 kg/m .  Physical Exam   GENERAL: alert, not distressed  CHEST: clear, no rales, rhonchi, or wheezes  CARDIAC: regular without murmur, gallop, or rub          Signed Electronically by: " Néstor Rendon MD

## 2025-05-12 LAB — NONINV COLON CA DNA+OCC BLD SCRN STL QL: NORMAL

## 2025-06-14 LAB — NONINV COLON CA DNA+OCC BLD SCRN STL QL: NEGATIVE

## 2025-07-03 ENCOUNTER — PATIENT OUTREACH (OUTPATIENT)
Dept: GERIATRIC MEDICINE | Facility: CLINIC | Age: 70
End: 2025-07-03
Payer: COMMERCIAL

## 2025-07-08 ENCOUNTER — PATIENT OUTREACH (OUTPATIENT)
Dept: GERIATRIC MEDICINE | Facility: CLINIC | Age: 70
End: 2025-07-08
Payer: COMMERCIAL

## 2025-07-08 NOTE — PROGRESS NOTES
"7/3/25    Received an email from 's CADI CM, Elza, stating that the PCA agency is unable to bill for services as of 7/1/25. She is wondering if there's anything on the \"care coordinator side\" that is causing the issue. Elza reported that  had recently decided on a [employer] Consultation provider, Eriberto, and no authorization has been entered in yet. I informed Elza that it sounds like 's temp PCA auth ended on 6/30/25. I informed Elza that she would need to authorize consultation for  and authorize an extension of 3 months of temp PCA.    Elza replied stating she's unsure who's error is is on because 's PCA [facility] until [facility] end of December 2025. She stated that since  is over age 65, PCA is billed through [facility] and that's the reason DHS 5841 forms are forwarded to care coordinators. Elza is wondering if I know why it's not billable. I informed Elza that Care Coordinators no longer do anything with [facility] PCA/CFSS auths and asked if there's a contact at [facility] that her agency could contact regarding this situation.     Received an email from Virgil at [facility] inquiring about 's PCA auth. I informed Virgil that I wouldn't do anything with member's PCA authorization since member's on CADI. Informed Virgil to reach out to the CADI CM and [facility] Provider Assistance.     Alexandra Ortiz, [facility]  [phone]   "

## 2025-07-08 NOTE — PROGRESS NOTES
7/8/25    I spoke with Agustin at [facility] to explain member's PCA authorization situation to him. Agustin reported that member's temp PCA authorization ended on 6/30/25 and it's the CADI CM's responsibility to put in the authorization to extend the temp PCA authorization another 3 months. Agustin stated that if I keep getting push back, for the CADI agency to contact [facility] and they will provide more education.    Spoke with member's daughter, Amber, to explain the situation.     DHRUV Hidalgo  [facility]  [phone]

## 2025-07-08 NOTE — PROGRESS NOTES
[facility] Six-Month Telephone Assessment    6 month telephone assessment completed on 7/8/25.    ER visits: No  Hospitalizations: No  TCU stays: No  Significant health status changes: None reported.   Falls/Injuries: No  ADL/IADL changes: No  Changes in services: No    Caregiver Assessment follow up:  N/A    Goals: See Support Plan for goal progress documentation.      Member's daughter, Amber, wanted to know if member qualified for [facility]. I reviewed [place]'s [place] and member does not have Medicare Part [place] and B. Informed Amber that member would need that in order to qualify. Gave Amber information for the Social Security Administration and for Amber to follow-up to see what the next steps are.     Will see member in 6 months for an annual health risk assessment.   Encouraged member to call CC with any questions or concerns in the meantime.     DHRUV Hidalgo  [facility]  [phone]

## 2025-07-17 ENCOUNTER — TELEPHONE (OUTPATIENT)
Dept: FAMILY MEDICINE | Facility: CLINIC | Age: 70
End: 2025-07-17
Payer: COMMERCIAL

## 2025-07-17 NOTE — TELEPHONE ENCOUNTER
Forms/Letter Request    Type of form/letter: DME (wheelchair, hospital bed)    Type of DME requested: pull up    Do we have the form/letter: Yes: PCP in basket    Who is the form from? [employer] (if other please explain)    Where did/will the form come from? form was faxed in    When is form/letter needed by: ASAP    How would you like the form/letter returned: Fax : [phone]    Patient Notified form requests are processed in 5-7 business days:No

## 2025-07-21 NOTE — TELEPHONE ENCOUNTER
Form completed by provider, form faxed to [phone]. Form sent to HIM for scanning, completing task and closing encounter.  Marialuisa Cook

## 2025-07-22 ENCOUNTER — MEDICAL CORRESPONDENCE (OUTPATIENT)
Dept: HEALTH INFORMATION MANAGEMENT | Facility: CLINIC | Age: 70
End: 2025-07-22
Payer: COMMERCIAL